# Patient Record
Sex: FEMALE | Race: WHITE | Employment: UNEMPLOYED | URBAN - METROPOLITAN AREA
[De-identification: names, ages, dates, MRNs, and addresses within clinical notes are randomized per-mention and may not be internally consistent; named-entity substitution may affect disease eponyms.]

---

## 2018-06-01 ENCOUNTER — TRANSFERRED RECORDS (OUTPATIENT)
Dept: HEALTH INFORMATION MANAGEMENT | Facility: CLINIC | Age: 33
End: 2018-06-01

## 2018-06-01 ENCOUNTER — HOSPITAL ENCOUNTER (INPATIENT)
Facility: CLINIC | Age: 33
LOS: 4 days | Discharge: HOME | End: 2018-06-05
Attending: OBSTETRICS & GYNECOLOGY | Admitting: OBSTETRICS & GYNECOLOGY
Payer: MEDICAID

## 2018-06-01 DIAGNOSIS — Z90.710 S/P EMERGENCY CESAREAN HYSTERECTOMY: Primary | ICD-10-CM

## 2018-06-01 PROBLEM — Z36.89 ENCOUNTER FOR TRIAGE IN PREGNANT PATIENT: Status: ACTIVE | Noted: 2018-06-01

## 2018-06-01 PROBLEM — O44.20 PARTIAL PREVIA: Status: ACTIVE | Noted: 2018-06-01

## 2018-06-01 LAB
AMPHETAMINES UR QL SCN: NEGATIVE
CANNABINOIDS UR QL: ABNORMAL
COCAINE UR QL: NEGATIVE
OPIATES UR QL SCN: NEGATIVE
PCP UR QL SCN: NEGATIVE

## 2018-06-01 PROCEDURE — 80307 DRUG TEST PRSMV CHEM ANLYZR: CPT | Performed by: OBSTETRICS & GYNECOLOGY

## 2018-06-01 PROCEDURE — 80349 CANNABINOIDS NATURAL: CPT | Performed by: OBSTETRICS & GYNECOLOGY

## 2018-06-01 PROCEDURE — 12000030 ZZH R&B OB INTERMEDIATE UMMC

## 2018-06-01 RX ORDER — ONDANSETRON 2 MG/ML
4 INJECTION INTRAMUSCULAR; INTRAVENOUS EVERY 6 HOURS PRN
Status: DISCONTINUED | OUTPATIENT
Start: 2018-06-01 | End: 2018-06-05 | Stop reason: HOSPADM

## 2018-06-01 RX ORDER — ACETAMINOPHEN 325 MG/1
975 TABLET ORAL EVERY 4 HOURS PRN
Status: DISCONTINUED | OUTPATIENT
Start: 2018-06-01 | End: 2018-06-02

## 2018-06-01 RX ORDER — LIDOCAINE 40 MG/G
CREAM TOPICAL
Status: DISCONTINUED | OUTPATIENT
Start: 2018-06-01 | End: 2018-06-02

## 2018-06-01 RX ORDER — PRENATAL VIT/IRON FUM/FOLIC AC 27MG-0.8MG
1 TABLET ORAL DAILY
COMMUNITY

## 2018-06-01 NOTE — IP AVS SNAPSHOT
MRN:[id number]                      After Visit Summary   2018    Emi Yun    MRN: [id number]           Thank you!     Thank you for choosing [facility] for your care. Our goal is always to provide you with excellent care. Hearing back from our patients is one way we can continue to improve our services. Please take a few minutes to complete the written survey that you may receive in the mail after you visit with us. Thank you!        Patient Information     Date Of Birth          1985        About your hospital stay     You were admitted on:  2018 You last received care in the:  [facility]    You were discharged on:  2018        Reason for your hospital stay       Maternity care, delivery by C section with uterus removed                  Who to Call     For medical emergencies, please call 911.  For non-urgent questions about your medical care, please call your primary care provider or clinic, [phone]  For questions related to your surgery, please call your surgery clinic        Attending Provider     Provider Specialty    Darrell Orozco MD OB/Gyn    Vernell Rain MD OB/Gyn       Primary Care Provider Office Phone # Fax #    [facility] [phone]      After Care Instructions     Activity       Review discharge instructions            Diet       Resume previous diet            Discharge Instructions - Postpartum visit       Schedule postpartum visit with your provider and return to clinic in 2 weeks for a surgical check in and in 6 weeks for a routine postpartum visit.                  Follow-up Appointments     Follow Up ([facility])       Please see your home OB provider or one of the Women's Health OB providers at 1 week for a well-being and mood check.            Follow Up and recommended labs and tests       Activity Instructions:   -  hysterectomy delivery: No lifting more than 15 pounds for 6 weeks.  Nothing  in the vagina for 6 weeks, no intercourse for 6 weeks. No strenuous exercise for 6 weeks. No driving until you have stopped taking your pain medications.    Call your health care provider if you have any of the following: Fever above 100.4 F; opening or drainage from your incision; soaking a sanitary pad with blood within 1 hour, or you see blood clots larger than a golf ball; malodorous vaginal discharge, severe or worsening pain uncontrolled by your pain medications, nausea and vomiting, severe headaches, changes in vision, calf swelling or pain, shortness of breath, problems coping with sadness, anxiety, or depression.  If you have any concerns about hurting yourself or the baby, call your provider immediately. You are encouraged to call with questions or concerns after you return home.                  Further instructions from your care team       Postop  Birth Instructions    Activity       Do not lift more than 10 pounds for 6 weeks after surgery.  Ask family and friends for help when you need it.    No driving until you have stopped taking your pain medications (usually two weeks after surgery).    No heavy exercise or activity for 6 weeks.  Don't do anything that will put a strain on your surgery site.    Don't strain when using the toilet.  Your care team may prescribe a stool softener if you have problems with your bowel movements.     To care for your incision:       Keep the incision clean and dry.    Do not soak your incision in water. No swimming or hot tubs until it has fully healed. You may soak in the bathtub if the water level is below your incision.    Do not use peroxide, gel, cream, lotion, or ointment on your incision.    Adjust your clothes to avoid pressure on your surgery site (check the elastic in your underwear for example).     You may see a small amount of clear or pink drainage and this is normal.  Check with your health care provider:       If the drainage increases or has an  odor.    If the incision reddens, you have swelling, or develop a rash.    If you have increased pain and the medicine we prescribed doesn't help.    If you have a fever above 100.4 F (38 C) with or without chills when placing thermometer under your tongue.   The area around your incision (surgery wound), will feel numb.  This is normal. The numbness should go away in less than a year.     Keep your hands clean:  Always wash your hands before touching your incision (surgery wound). This helps reduce your risk of infection. If your hands aren't dirty, you may use an alcohol hand-rub to clean your hands. Keep your nails clean and short.    Call your healthcare provider if you have any of these symptoms:       You soak a sanitary pad with blood within 1 hour, or you see blood clots larger than a golf ball.    Bleeding that lasts more than 6 weeks.    Vaginal discharge that smells bad.    Severe pain, cramping or tenderness in your lower belly area.    A need to urinate more frequently (use the toilet more often), more urgently (use the toilet very quickly), or it burns when you urinate.    Nausea and vomiting.    Redness, swelling or pain around a vein in your leg.    Problems breastfeeding or a red or painful area on your breast.    Chest pain and cough or are gasping for air.    Problems with coping with sadness, anxiety or depression. If you have concerns about hurting yourself or the baby, call your provider immediately.      You have questions or concerns after you return home.                  Pending Results     Date and Time Order Name Status Description    6/2/2018 1341 Surgical pathology exam In process     6/2/2018 0946 Plasma prepare order unit In process     6/1/2018 2202 Cannabinoids qualitative confirm urine In process             Statement of Approval     Ordered          06/05/18 4123  I have reviewed and agree with all the recommendations and orders detailed in this document.  EFFECTIVE NOW    "  Approved and electronically signed by:  Penelope Donald MD             Admission Information     Date & Time Provider Department Dept. Phone    2018 Vernell Rain MD [facility] [phone]      Your Vitals Were     Blood Pressure Pulse Temperature Respirations Height Weight    139/70 54 97.9  F (36.6  C) (Oral) 16 1.727 m (5' 8\") 95.3 kg (210 lb)    Last Period Pulse Oximetry BMI (Body Mass Index)             2017 98% 31.93 kg/m2         [facility] Information     [facility] lets you send messages to your doctor, view your test results, renew your prescriptions, schedule appointments and more. To sign up, go to [email or web address] . Click on \"Log in\" on the left side of the screen, which will take you to the Welcome page. Then click on \"Sign up Now\" on the right side of the page.     You will be asked to enter the access code listed below, as well as some personal information. Please follow the directions to create your username and password.     Your access code is: [email or web address]  Expires: 9/3/2018 11:43 AM     Your access code will  in 90 days. If you need help or a new code, please call your [facility] clinic or [phone].        Care EveryWhere ID     This is your Care EveryWhere ID. This could be used by other organizations to access your [facility] medical records  [id number]        Equal Access to Services     [facility] Hospital[facility] : Hadii [place] warreno Socarly, waaxda luqadaha, qaybta kaalmada satish, albert souza . So [facility] [phone].    ATENCIÓN: Si habla español, tiene a jeffers disposición servicios gratuitos de asistencia lingüística. Llame al [phone].    We comply with applicable federal civil rights laws and [place] laws. We do not discriminate on the basis of race, color, national origin, age, disability, sex, sexual orientation, or gender identity.               Review of your medicines      START taking        Dose / Directions    ferrous sulfate " "325 (65 Fe) MG tablet   Commonly known as:  IRON        Dose:  325 mg   Take 1 tablet (325 mg) by mouth daily (with breakfast)   Quantity:  90 tablet   Refills:  1       ibuprofen 600 MG tablet   Commonly known as:  ADVIL/MOTRIN        Dose:  600 mg   Take 1 tablet (600 mg) by mouth every 6 hours as needed for moderate pain   Quantity:  40 tablet   Refills:  0       oxyCODONE-acetaminophen 5-325 MG per tablet   Commonly known as:  PERCOCET        Dose:  1 tablet   Take 1 tablet by mouth every 6 hours as needed for pain   Quantity:  12 tablet   Refills:  0       senna-docusate 8.6-50 MG per tablet   Commonly known as:  SENOKOT-S;PERICOLACE        Dose:  1-2 tablet   Take 1-2 tablets by mouth 2 times daily as needed for constipation   Quantity:  60 tablet   Refills:  0         CONTINUE these medicines which have NOT CHANGED        Dose / Directions    prenatal multivitamin plus iron 27-0.8 MG Tabs per tablet        Dose:  1 tablet   Take 1 tablet by mouth daily   Refills:  0            Where to get your medicines      These medications were sent to [facility] [address]  [address] [address]     Phone:  [phone]     ferrous sulfate 325 (65 Fe) MG tablet    ibuprofen 600 MG tablet    senna-docusate 8.6-50 MG per tablet         Some of these will need a paper prescription and others can be bought over the counter. Ask your nurse if you have questions.     Bring a paper prescription for each of these medications     oxyCODONE-acetaminophen 5-325 MG per tablet                Protect others around you: Learn how to safely use, store and throw away your medicines at www.disposemymeds.org.        Information about your nerve block     Today you received a block to numb the nerves near your surgery site.    This is a block using local anesthetic or \"numbing\" medication injected around the nerves to anesthetize or \"numb\" the area supplied by those nerves. " This block is injected into the muscle layer near your surgical site. The type of anesthesia (Exparel) your anesthesia team used to numb your abdomen may give you relief for up to 72 hours.     Diet: There are no diet restrictions, but you should drink plenty of fluids, unless you are on a fluid-restricted diet.     Activity: If your surgical site is an arm or leg you should be careful with your affected limb, since it is possible to injure your limb without being aware of it due to the numbing. Until full feeling returns, you should guard against bumping or hitting your limb, and avoid extreme hot or cold temperatures on the skin.    Pain Medication: As the block wears off, the feeling will return as a tingling or prickly sensation near your surgical site. You will experience more discomfort from your incisions as the feeling returns. You may want to take a pain pill (a narcotic or Tylenol if this was prescribed by your surgeon) when you start to experience mild pain, before the pain becomes more severe. If your pain medications do not control your pain, you should notify your surgeon. If you are taking narcotics for pain management, do not drink alcohol, drive a car, or perform hazardous activities.  If you have questions or concerns you may call your surgeon at the number provided with your discharge instructions.     Call your surgeon if you experience blurry vision, ringing in the ears or metallic taste in your mouth.         Information about OPIOIDS     PRESCRIPTION OPIOIDS: WHAT YOU NEED TO KNOW   You have a prescription for an opioid (narcotic) pain medicine. Opioids can cause addiction. If you have a history of chemical dependency of any type, you are at a higher risk of becoming addicted to opioids. Only take this medicine after all other options have been tried. Take it for as short a time and as few doses as possible.     Do not:    Drive. If you drive while taking these medicines, you could be arrested  for driving under the influence (DUI).    Operate heavy machinery    Do any other dangerous activities while taking these medicines.     Drink any alcohol while taking these medicines.      Take with any other medicines that contain acetaminophen. Read all labels carefully. Look for the word  acetaminophen  or  Tylenol.  Ask your pharmacist if you have questions or are unsure.    Store your pills in a secure place, locked if possible. We will not replace any lost or stolen medicine. If you don t finish your medicine, please throw away (dispose) as directed by your pharmacist. The [place] Pollution Control Agency has more information about safe disposal: https://www.pca.[facility].mn.us/living-green/managing-unwanted-medications    All opioids tend to cause constipation. Drink plenty of water and eat foods that have a lot of fiber, such as fruits, vegetables, prune juice, apple juice and high-fiber cereal. Take a laxative (Miralax, milk of magnesia, Colace, Senna) if you don t move your bowels at least every other day.              Medication List: This is a list of all your medications and when to take them. Check marks below indicate your daily home schedule. Keep this list as a reference.      Medications           Morning Afternoon Evening Bedtime As Needed    ferrous sulfate 325 (65 Fe) MG tablet   Commonly known as:  IRON   Take 1 tablet (325 mg) by mouth daily (with breakfast)                                ibuprofen 600 MG tablet   Commonly known as:  ADVIL/MOTRIN   Take 1 tablet (600 mg) by mouth every 6 hours as needed for moderate pain   Last time this was given:  600 mg on 6/5/2018  7:06 AM                                oxyCODONE-acetaminophen 5-325 MG per tablet   Commonly known as:  PERCOCET   Take 1 tablet by mouth every 6 hours as needed for pain                                prenatal multivitamin plus iron 27-0.8 MG Tabs per tablet   Take 1 tablet by mouth daily                                 senna-docusate 8.6-50 MG per tablet   Commonly known as:  SENOKOT-S;PERICOLACE   Take 1-2 tablets by mouth 2 times daily as needed for constipation   Last time this was given:  2 tablets on 6/5/2018  7:39 AM

## 2018-06-01 NOTE — IP AVS SNAPSHOT
[facility]    [address] [address]    Phone:  [phone]                                       After Visit Summary   6/1/2018    Emi Yun    MRN: [id number]           After Visit Summary Signature Page     I have received my discharge instructions, and my questions have been answered. I have discussed any challenges I see with this plan with the nurse or doctor.    ..........................................................................................................................................  Patient/Patient Representative Signature      ..........................................................................................................................................  Patient Representative Print Name and Relationship to Patient    ..................................................               ................................................  Date                                            Time    ..........................................................................................................................................  Reviewed by Signature/Title    ...................................................              ..............................................  Date                                                            Time

## 2018-06-02 ENCOUNTER — APPOINTMENT (OUTPATIENT)
Dept: GENERAL RADIOLOGY | Facility: CLINIC | Age: 33
End: 2018-06-02
Attending: OBSTETRICS & GYNECOLOGY
Payer: MEDICAID

## 2018-06-02 ENCOUNTER — ANESTHESIA EVENT (OUTPATIENT)
Dept: SURGERY | Facility: CLINIC | Age: 33
End: 2018-06-02
Payer: MEDICAID

## 2018-06-02 ENCOUNTER — APPOINTMENT (OUTPATIENT)
Dept: LAB | Facility: CLINIC | Age: 33
End: 2018-06-02
Attending: OBSTETRICS & GYNECOLOGY
Payer: MEDICAID

## 2018-06-02 ENCOUNTER — HOSPITAL ENCOUNTER (INPATIENT)
Dept: ULTRASOUND IMAGING | Facility: CLINIC | Age: 33
End: 2018-06-02
Attending: OBSTETRICS & GYNECOLOGY
Payer: MEDICAID

## 2018-06-02 ENCOUNTER — ANESTHESIA (OUTPATIENT)
Dept: SURGERY | Facility: CLINIC | Age: 33
End: 2018-06-02
Payer: MEDICAID

## 2018-06-02 PROBLEM — Z90.710 S/P EMERGENCY CESAREAN HYSTERECTOMY: Status: ACTIVE | Noted: 2018-06-02

## 2018-06-02 LAB
ALBUMIN SERPL-MCNC: 2.2 G/DL (ref 3.4–5)
ALP SERPL-CCNC: 178 U/L (ref 40–150)
ALT SERPL W P-5'-P-CCNC: <6 U/L (ref 0–50)
ANION GAP SERPL CALCULATED.3IONS-SCNC: 9 MMOL/L (ref 3–14)
APTT PPP: 28 SEC (ref 22–37)
APTT PPP: 28 SEC (ref 22–37)
APTT PPP: 30 SEC (ref 22–37)
AST SERPL W P-5'-P-CCNC: 10 U/L (ref 0–45)
BASOPHILS # BLD AUTO: 0 10E9/L (ref 0–0.2)
BASOPHILS NFR BLD AUTO: 0.4 %
BILIRUB SERPL-MCNC: 0.3 MG/DL (ref 0.2–1.3)
BLD PROD TYP BPU: NORMAL
BLD UNIT ID BPU: 0
BLOOD PRODUCT CODE: NORMAL
BPU ID: NORMAL
BUN SERPL-MCNC: 9 MG/DL (ref 7–30)
CALCIUM SERPL-MCNC: 8.5 MG/DL (ref 8.5–10.1)
CHLORIDE SERPL-SCNC: 110 MMOL/L (ref 94–109)
CO2 SERPL-SCNC: 22 MMOL/L (ref 20–32)
CREAT SERPL-MCNC: 0.69 MG/DL (ref 0.52–1.04)
DIFFERENTIAL METHOD BLD: ABNORMAL
EOSINOPHIL # BLD AUTO: 0 10E9/L (ref 0–0.7)
EOSINOPHIL NFR BLD AUTO: 0.2 %
ERYTHROCYTE [DISTWIDTH] IN BLOOD BY AUTOMATED COUNT: 16.8 % (ref 10–15)
ERYTHROCYTE [DISTWIDTH] IN BLOOD BY AUTOMATED COUNT: 16.8 % (ref 10–15)
ERYTHROCYTE [DISTWIDTH] IN BLOOD BY AUTOMATED COUNT: 16.9 % (ref 10–15)
FIBRINOGEN PPP-MCNC: 399 MG/DL (ref 200–420)
FIBRINOGEN PPP-MCNC: 521 MG/DL (ref 200–420)
GFR SERPL CREATININE-BSD FRML MDRD: >90 ML/MIN/1.7M2
GLUCOSE SERPL-MCNC: 67 MG/DL (ref 70–99)
HCT VFR BLD AUTO: 29.8 % (ref 35–47)
HCT VFR BLD AUTO: 34.4 % (ref 35–47)
HCT VFR BLD AUTO: 35 % (ref 35–47)
HGB BLD-MCNC: 10.6 G/DL (ref 11.7–15.7)
HGB BLD-MCNC: 10.6 G/DL (ref 11.7–15.7)
HGB BLD-MCNC: 9.4 G/DL (ref 11.7–15.7)
HGB BLD-MCNC: 9.8 G/DL (ref 11.7–15.7)
IMM GRANULOCYTES # BLD: 0 10E9/L (ref 0–0.4)
IMM GRANULOCYTES NFR BLD: 0.3 %
INR PPP: 0.98 (ref 0.86–1.14)
INR PPP: 1.02 (ref 0.86–1.14)
INR PPP: 1.07 (ref 0.86–1.14)
LYMPHOCYTES # BLD AUTO: 2.1 10E9/L (ref 0.8–5.3)
LYMPHOCYTES NFR BLD AUTO: 21.8 %
MCH RBC QN AUTO: 24.3 PG (ref 26.5–33)
MCH RBC QN AUTO: 25.8 PG (ref 26.5–33)
MCH RBC QN AUTO: 26.2 PG (ref 26.5–33)
MCHC RBC AUTO-ENTMCNC: 30.3 G/DL (ref 31.5–36.5)
MCHC RBC AUTO-ENTMCNC: 30.8 G/DL (ref 31.5–36.5)
MCHC RBC AUTO-ENTMCNC: 31.5 G/DL (ref 31.5–36.5)
MCV RBC AUTO: 80 FL (ref 78–100)
MCV RBC AUTO: 83 FL (ref 78–100)
MCV RBC AUTO: 84 FL (ref 78–100)
MONOCYTES # BLD AUTO: 0.4 10E9/L (ref 0–1.3)
MONOCYTES NFR BLD AUTO: 4.5 %
NEUTROPHILS # BLD AUTO: 7.1 10E9/L (ref 1.6–8.3)
NEUTROPHILS NFR BLD AUTO: 72.8 %
NRBC # BLD AUTO: 0 10*3/UL
NRBC BLD AUTO-RTO: 0 /100
PLATELET # BLD AUTO: 241 10E9/L (ref 150–450)
PLATELET # BLD AUTO: 266 10E9/L (ref 150–450)
PLATELET # BLD AUTO: 281 10E9/L (ref 150–450)
PLATELET # BLD AUTO: 302 10E9/L (ref 150–450)
POTASSIUM SERPL-SCNC: 4.2 MMOL/L (ref 3.4–5.3)
PROT SERPL-MCNC: 6.9 G/DL (ref 6.8–8.8)
RBC # BLD AUTO: 3.59 10E12/L (ref 3.8–5.2)
RBC # BLD AUTO: 4.11 10E12/L (ref 3.8–5.2)
RBC # BLD AUTO: 4.37 10E12/L (ref 3.8–5.2)
SODIUM SERPL-SCNC: 141 MMOL/L (ref 133–144)
T PALLIDUM AB SER QL: NONREACTIVE
TRANSFUSION STATUS PATIENT QL: NORMAL
WBC # BLD AUTO: 17.6 10E9/L (ref 4–11)
WBC # BLD AUTO: 17.7 10E9/L (ref 4–11)
WBC # BLD AUTO: 9.8 10E9/L (ref 4–11)

## 2018-06-02 PROCEDURE — 0TJB8ZZ INSPECTION OF BLADDER, VIA NATURAL OR ARTIFICIAL OPENING ENDOSCOPIC: ICD-10-PCS | Performed by: OBSTETRICS & GYNECOLOGY

## 2018-06-02 PROCEDURE — 25000128 H RX IP 250 OP 636

## 2018-06-02 PROCEDURE — 85610 PROTHROMBIN TIME: CPT | Performed by: ANESTHESIOLOGY

## 2018-06-02 PROCEDURE — 40000170 ZZH STATISTIC PRE-PROCEDURE ASSESSMENT II: Performed by: OBSTETRICS & GYNECOLOGY

## 2018-06-02 PROCEDURE — 27210995 ZZH RX 272: Performed by: OBSTETRICS & GYNECOLOGY

## 2018-06-02 PROCEDURE — 86706 HEP B SURFACE ANTIBODY: CPT | Performed by: OBSTETRICS & GYNECOLOGY

## 2018-06-02 PROCEDURE — 25000128 H RX IP 250 OP 636: Performed by: OBSTETRICS & GYNECOLOGY

## 2018-06-02 PROCEDURE — 25000132 ZZH RX MED GY IP 250 OP 250 PS 637: Performed by: OBSTETRICS & GYNECOLOGY

## 2018-06-02 PROCEDURE — 25000565 ZZH ISOFLURANE, EA 15 MIN: Performed by: OBSTETRICS & GYNECOLOGY

## 2018-06-02 PROCEDURE — P9059 PLASMA, FRZ BETWEEN 8-24HOUR: HCPCS | Performed by: OBSTETRICS & GYNECOLOGY

## 2018-06-02 PROCEDURE — 36000068 ZZH SURGERY LEVEL 5 1ST 30 MIN - UMMC: Performed by: OBSTETRICS & GYNECOLOGY

## 2018-06-02 PROCEDURE — 88307 TISSUE EXAM BY PATHOLOGIST: CPT | Performed by: OBSTETRICS & GYNECOLOGY

## 2018-06-02 PROCEDURE — 37000008 ZZH ANESTHESIA TECHNICAL FEE, 1ST 30 MIN: Performed by: OBSTETRICS & GYNECOLOGY

## 2018-06-02 PROCEDURE — 25000128 H RX IP 250 OP 636: Performed by: NURSE ANESTHETIST, CERTIFIED REGISTERED

## 2018-06-02 PROCEDURE — P9016 RBC LEUKOCYTES REDUCED: HCPCS | Performed by: OBSTETRICS & GYNECOLOGY

## 2018-06-02 PROCEDURE — 86900 BLOOD TYPING SEROLOGIC ABO: CPT | Performed by: OBSTETRICS & GYNECOLOGY

## 2018-06-02 PROCEDURE — 25000128 H RX IP 250 OP 636: Performed by: ANESTHESIOLOGY

## 2018-06-02 PROCEDURE — 85049 AUTOMATED PLATELET COUNT: CPT | Performed by: OBSTETRICS & GYNECOLOGY

## 2018-06-02 PROCEDURE — 80053 COMPREHEN METABOLIC PANEL: CPT | Performed by: OBSTETRICS & GYNECOLOGY

## 2018-06-02 PROCEDURE — 40000977 ZZH STATISTIC ATTENDANCE AT DELIVERY

## 2018-06-02 PROCEDURE — 36415 COLL VENOUS BLD VENIPUNCTURE: CPT | Performed by: OBSTETRICS & GYNECOLOGY

## 2018-06-02 PROCEDURE — 0UT70ZZ RESECTION OF BILATERAL FALLOPIAN TUBES, OPEN APPROACH: ICD-10-PCS | Performed by: OBSTETRICS & GYNECOLOGY

## 2018-06-02 PROCEDURE — 86923 COMPATIBILITY TEST ELECTRIC: CPT | Performed by: OBSTETRICS & GYNECOLOGY

## 2018-06-02 PROCEDURE — 12000030 ZZH R&B OB INTERMEDIATE UMMC

## 2018-06-02 PROCEDURE — 40000278 XR SURGERY CARM FLUORO LESS THAN 5 MIN: Mod: TC

## 2018-06-02 PROCEDURE — 71000015 ZZH RECOVERY PHASE 1 LEVEL 2 EA ADDTL HR: Performed by: OBSTETRICS & GYNECOLOGY

## 2018-06-02 PROCEDURE — 86780 TREPONEMA PALLIDUM: CPT | Performed by: OBSTETRICS & GYNECOLOGY

## 2018-06-02 PROCEDURE — 85730 THROMBOPLASTIN TIME PARTIAL: CPT | Performed by: ANESTHESIOLOGY

## 2018-06-02 PROCEDURE — 85730 THROMBOPLASTIN TIME PARTIAL: CPT | Performed by: OBSTETRICS & GYNECOLOGY

## 2018-06-02 PROCEDURE — 76811 OB US DETAILED SNGL FETUS: CPT

## 2018-06-02 PROCEDURE — G0463 HOSPITAL OUTPT CLINIC VISIT: HCPCS

## 2018-06-02 PROCEDURE — 86850 RBC ANTIBODY SCREEN: CPT | Performed by: OBSTETRICS & GYNECOLOGY

## 2018-06-02 PROCEDURE — 36415 COLL VENOUS BLD VENIPUNCTURE: CPT | Performed by: ANESTHESIOLOGY

## 2018-06-02 PROCEDURE — C9290 INJ, BUPIVACAINE LIPOSOME: HCPCS | Performed by: ANESTHESIOLOGY

## 2018-06-02 PROCEDURE — 88307 TISSUE EXAM BY PATHOLOGIST: CPT | Mod: 26 | Performed by: OBSTETRICS & GYNECOLOGY

## 2018-06-02 PROCEDURE — 36000070 ZZH SURGERY LEVEL 5 EA 15 ADDTL MIN - UMMC: Performed by: OBSTETRICS & GYNECOLOGY

## 2018-06-02 PROCEDURE — 87389 HIV-1 AG W/HIV-1&-2 AB AG IA: CPT | Performed by: OBSTETRICS & GYNECOLOGY

## 2018-06-02 PROCEDURE — 27210794 ZZH OR GENERAL SUPPLY STERILE: Performed by: OBSTETRICS & GYNECOLOGY

## 2018-06-02 PROCEDURE — 85025 COMPLETE CBC W/AUTO DIFF WBC: CPT | Performed by: OBSTETRICS & GYNECOLOGY

## 2018-06-02 PROCEDURE — 85610 PROTHROMBIN TIME: CPT | Performed by: OBSTETRICS & GYNECOLOGY

## 2018-06-02 PROCEDURE — 40000344 ZZHCL STATISTIC THAWING COMPONENT: Performed by: OBSTETRICS & GYNECOLOGY

## 2018-06-02 PROCEDURE — 25000125 ZZHC RX 250: Performed by: NURSE ANESTHETIST, CERTIFIED REGISTERED

## 2018-06-02 PROCEDURE — 0UT90ZZ RESECTION OF UTERUS, OPEN APPROACH: ICD-10-PCS | Performed by: OBSTETRICS & GYNECOLOGY

## 2018-06-02 PROCEDURE — 85384 FIBRINOGEN ACTIVITY: CPT | Performed by: ANESTHESIOLOGY

## 2018-06-02 PROCEDURE — 85018 HEMOGLOBIN: CPT | Performed by: OBSTETRICS & GYNECOLOGY

## 2018-06-02 PROCEDURE — 86762 RUBELLA ANTIBODY: CPT | Performed by: OBSTETRICS & GYNECOLOGY

## 2018-06-02 PROCEDURE — 85384 FIBRINOGEN ACTIVITY: CPT | Performed by: OBSTETRICS & GYNECOLOGY

## 2018-06-02 PROCEDURE — 37000009 ZZH ANESTHESIA TECHNICAL FEE, EACH ADDTL 15 MIN: Performed by: OBSTETRICS & GYNECOLOGY

## 2018-06-02 PROCEDURE — 25000125 ZZHC RX 250: Performed by: OBSTETRICS & GYNECOLOGY

## 2018-06-02 PROCEDURE — 86803 HEPATITIS C AB TEST: CPT | Performed by: OBSTETRICS & GYNECOLOGY

## 2018-06-02 PROCEDURE — 71000014 ZZH RECOVERY PHASE 1 LEVEL 2 FIRST HR: Performed by: OBSTETRICS & GYNECOLOGY

## 2018-06-02 PROCEDURE — 25000125 ZZHC RX 250: Performed by: ANESTHESIOLOGY

## 2018-06-02 PROCEDURE — 86901 BLOOD TYPING SEROLOGIC RH(D): CPT | Performed by: OBSTETRICS & GYNECOLOGY

## 2018-06-02 PROCEDURE — 85027 COMPLETE CBC AUTOMATED: CPT | Performed by: OBSTETRICS & GYNECOLOGY

## 2018-06-02 PROCEDURE — 74018 RADEX ABDOMEN 1 VIEW: CPT

## 2018-06-02 PROCEDURE — P9041 ALBUMIN (HUMAN),5%, 50ML: HCPCS | Performed by: NURSE ANESTHETIST, CERTIFIED REGISTERED

## 2018-06-02 RX ORDER — SODIUM CHLORIDE, SODIUM LACTATE, POTASSIUM CHLORIDE, CALCIUM CHLORIDE 600; 310; 30; 20 MG/100ML; MG/100ML; MG/100ML; MG/100ML
INJECTION, SOLUTION INTRAVENOUS CONTINUOUS
Status: DISCONTINUED | OUTPATIENT
Start: 2018-06-02 | End: 2018-06-02 | Stop reason: HOSPADM

## 2018-06-02 RX ORDER — HYDROCORTISONE 2.5 %
CREAM (GRAM) TOPICAL 3 TIMES DAILY PRN
Status: DISCONTINUED | OUTPATIENT
Start: 2018-06-02 | End: 2018-06-05 | Stop reason: HOSPADM

## 2018-06-02 RX ORDER — DIPHENHYDRAMINE HCL 25 MG
25 CAPSULE ORAL EVERY 6 HOURS PRN
Status: DISCONTINUED | OUTPATIENT
Start: 2018-06-02 | End: 2018-06-05 | Stop reason: HOSPADM

## 2018-06-02 RX ORDER — MORPHINE SULFATE 1 MG/ML
INJECTION, SOLUTION EPIDURAL; INTRATHECAL; INTRAVENOUS PRN
Status: DISCONTINUED | OUTPATIENT
Start: 2018-06-02 | End: 2018-06-02

## 2018-06-02 RX ORDER — FENTANYL CITRATE 50 UG/ML
INJECTION, SOLUTION INTRAMUSCULAR; INTRAVENOUS PRN
Status: DISCONTINUED | OUTPATIENT
Start: 2018-06-02 | End: 2018-06-02

## 2018-06-02 RX ORDER — KETOROLAC TROMETHAMINE 30 MG/ML
30 INJECTION, SOLUTION INTRAMUSCULAR; INTRAVENOUS EVERY 6 HOURS
Status: COMPLETED | OUTPATIENT
Start: 2018-06-02 | End: 2018-06-03

## 2018-06-02 RX ORDER — GLYCOPYRROLATE 0.2 MG/ML
INJECTION, SOLUTION INTRAMUSCULAR; INTRAVENOUS PRN
Status: DISCONTINUED | OUTPATIENT
Start: 2018-06-02 | End: 2018-06-02

## 2018-06-02 RX ORDER — HYDROMORPHONE HYDROCHLORIDE 1 MG/ML
.3-.5 INJECTION, SOLUTION INTRAMUSCULAR; INTRAVENOUS; SUBCUTANEOUS EVERY 5 MIN PRN
Status: DISCONTINUED | OUTPATIENT
Start: 2018-06-02 | End: 2018-06-02

## 2018-06-02 RX ORDER — SODIUM CHLORIDE 9 MG/ML
INJECTION, SOLUTION INTRAVENOUS CONTINUOUS PRN
Status: DISCONTINUED | OUTPATIENT
Start: 2018-06-02 | End: 2018-06-02

## 2018-06-02 RX ORDER — CLINDAMYCIN PHOSPHATE 900 MG/50ML
900 INJECTION, SOLUTION INTRAVENOUS
Status: COMPLETED | OUTPATIENT
Start: 2018-06-02 | End: 2018-06-02

## 2018-06-02 RX ORDER — OXYCODONE HYDROCHLORIDE 5 MG/1
5-10 TABLET ORAL
Status: DISCONTINUED | OUTPATIENT
Start: 2018-06-02 | End: 2018-06-05 | Stop reason: HOSPADM

## 2018-06-02 RX ORDER — NALOXONE HYDROCHLORIDE 0.4 MG/ML
.1-.4 INJECTION, SOLUTION INTRAMUSCULAR; INTRAVENOUS; SUBCUTANEOUS
Status: ACTIVE | OUTPATIENT
Start: 2018-06-02 | End: 2018-06-03

## 2018-06-02 RX ORDER — CLINDAMYCIN PHOSPHATE 900 MG/50ML
900 INJECTION, SOLUTION INTRAVENOUS EVERY 8 HOURS
Status: COMPLETED | OUTPATIENT
Start: 2018-06-02 | End: 2018-06-03

## 2018-06-02 RX ORDER — CITRIC ACID/SODIUM CITRATE 334-500MG
30 SOLUTION, ORAL ORAL
Status: DISCONTINUED | OUTPATIENT
Start: 2018-06-02 | End: 2018-06-02 | Stop reason: HOSPADM

## 2018-06-02 RX ORDER — ACETAMINOPHEN 325 MG/1
650 TABLET ORAL EVERY 4 HOURS PRN
Status: DISCONTINUED | OUTPATIENT
Start: 2018-06-05 | End: 2018-06-02

## 2018-06-02 RX ORDER — BISACODYL 10 MG
10 SUPPOSITORY, RECTAL RECTAL DAILY PRN
Status: DISCONTINUED | OUTPATIENT
Start: 2018-06-04 | End: 2018-06-05 | Stop reason: HOSPADM

## 2018-06-02 RX ORDER — OXYTOCIN/0.9 % SODIUM CHLORIDE 30/500 ML
100 PLASTIC BAG, INJECTION (ML) INTRAVENOUS CONTINUOUS
Status: DISCONTINUED | OUTPATIENT
Start: 2018-06-02 | End: 2018-06-02

## 2018-06-02 RX ORDER — SODIUM CHLORIDE, SODIUM LACTATE, POTASSIUM CHLORIDE, CALCIUM CHLORIDE 600; 310; 30; 20 MG/100ML; MG/100ML; MG/100ML; MG/100ML
INJECTION, SOLUTION INTRAVENOUS CONTINUOUS PRN
Status: DISCONTINUED | OUTPATIENT
Start: 2018-06-02 | End: 2018-06-02

## 2018-06-02 RX ORDER — ALBUMIN, HUMAN INJ 5% 5 %
SOLUTION INTRAVENOUS CONTINUOUS PRN
Status: DISCONTINUED | OUTPATIENT
Start: 2018-06-02 | End: 2018-06-02

## 2018-06-02 RX ORDER — AMOXICILLIN 250 MG
1 CAPSULE ORAL 2 TIMES DAILY PRN
Status: DISCONTINUED | OUTPATIENT
Start: 2018-06-02 | End: 2018-06-05 | Stop reason: HOSPADM

## 2018-06-02 RX ORDER — OXYTOCIN/0.9 % SODIUM CHLORIDE 30/500 ML
PLASTIC BAG, INJECTION (ML) INTRAVENOUS
Status: DISCONTINUED
Start: 2018-06-02 | End: 2018-06-02 | Stop reason: HOSPADM

## 2018-06-02 RX ORDER — ONDANSETRON 2 MG/ML
4 INJECTION INTRAMUSCULAR; INTRAVENOUS EVERY 30 MIN PRN
Status: DISCONTINUED | OUTPATIENT
Start: 2018-06-02 | End: 2018-06-02

## 2018-06-02 RX ORDER — SODIUM CHLORIDE, SODIUM LACTATE, POTASSIUM CHLORIDE, CALCIUM CHLORIDE 600; 310; 30; 20 MG/100ML; MG/100ML; MG/100ML; MG/100ML
INJECTION, SOLUTION INTRAVENOUS CONTINUOUS
Status: DISCONTINUED | OUTPATIENT
Start: 2018-06-02 | End: 2018-06-05 | Stop reason: HOSPADM

## 2018-06-02 RX ORDER — NEOSTIGMINE METHYLSULFATE 1 MG/ML
VIAL (ML) INJECTION PRN
Status: DISCONTINUED | OUTPATIENT
Start: 2018-06-02 | End: 2018-06-02

## 2018-06-02 RX ORDER — MAGNESIUM HYDROXIDE 1200 MG/15ML
LIQUID ORAL PRN
Status: DISCONTINUED | OUTPATIENT
Start: 2018-06-02 | End: 2018-06-02

## 2018-06-02 RX ORDER — BUPIVACAINE HYDROCHLORIDE 7.5 MG/ML
INJECTION, SOLUTION INTRASPINAL
Status: DISCONTINUED
Start: 2018-06-02 | End: 2018-06-02 | Stop reason: HOSPADM

## 2018-06-02 RX ORDER — NALOXONE HYDROCHLORIDE 0.4 MG/ML
.1-.4 INJECTION, SOLUTION INTRAMUSCULAR; INTRAVENOUS; SUBCUTANEOUS
Status: DISCONTINUED | OUTPATIENT
Start: 2018-06-02 | End: 2018-06-02

## 2018-06-02 RX ORDER — PROPOFOL 10 MG/ML
INJECTION, EMULSION INTRAVENOUS PRN
Status: DISCONTINUED | OUTPATIENT
Start: 2018-06-02 | End: 2018-06-02

## 2018-06-02 RX ORDER — SODIUM CHLORIDE, SODIUM LACTATE, POTASSIUM CHLORIDE, CALCIUM CHLORIDE 600; 310; 30; 20 MG/100ML; MG/100ML; MG/100ML; MG/100ML
INJECTION, SOLUTION INTRAVENOUS CONTINUOUS
Status: DISCONTINUED | OUTPATIENT
Start: 2018-06-02 | End: 2018-06-02

## 2018-06-02 RX ORDER — MORPHINE SULFATE 1 MG/ML
INJECTION, SOLUTION EPIDURAL; INTRATHECAL; INTRAVENOUS
Status: DISCONTINUED
Start: 2018-06-02 | End: 2018-06-02 | Stop reason: HOSPADM

## 2018-06-02 RX ORDER — ONDANSETRON 2 MG/ML
4 INJECTION INTRAMUSCULAR; INTRAVENOUS EVERY 6 HOURS PRN
Status: DISCONTINUED | OUTPATIENT
Start: 2018-06-02 | End: 2018-06-02

## 2018-06-02 RX ORDER — LIDOCAINE 40 MG/G
CREAM TOPICAL
Status: DISCONTINUED | OUTPATIENT
Start: 2018-06-02 | End: 2018-06-05 | Stop reason: HOSPADM

## 2018-06-02 RX ORDER — CITRIC ACID/SODIUM CITRATE 334-500MG
30 SOLUTION, ORAL ORAL
Status: COMPLETED | OUTPATIENT
Start: 2018-06-02 | End: 2018-06-02

## 2018-06-02 RX ORDER — SODIUM CHLORIDE, SODIUM LACTATE, POTASSIUM CHLORIDE, CALCIUM CHLORIDE 600; 310; 30; 20 MG/100ML; MG/100ML; MG/100ML; MG/100ML
INJECTION, SOLUTION INTRAVENOUS
Status: COMPLETED
Start: 2018-06-02 | End: 2018-06-02

## 2018-06-02 RX ORDER — CITRIC ACID/SODIUM CITRATE 334-500MG
SOLUTION, ORAL ORAL
Status: DISCONTINUED
Start: 2018-06-02 | End: 2018-06-02 | Stop reason: HOSPADM

## 2018-06-02 RX ORDER — DIPHENHYDRAMINE HYDROCHLORIDE 50 MG/ML
25 INJECTION INTRAMUSCULAR; INTRAVENOUS EVERY 6 HOURS PRN
Status: DISCONTINUED | OUTPATIENT
Start: 2018-06-02 | End: 2018-06-05 | Stop reason: HOSPADM

## 2018-06-02 RX ORDER — DEXTROSE, SODIUM CHLORIDE, SODIUM LACTATE, POTASSIUM CHLORIDE, AND CALCIUM CHLORIDE 5; .6; .31; .03; .02 G/100ML; G/100ML; G/100ML; G/100ML; G/100ML
INJECTION, SOLUTION INTRAVENOUS CONTINUOUS
Status: DISCONTINUED | OUTPATIENT
Start: 2018-06-02 | End: 2018-06-02

## 2018-06-02 RX ORDER — OXYTOCIN/0.9 % SODIUM CHLORIDE 30/500 ML
PLASTIC BAG, INJECTION (ML) INTRAVENOUS CONTINUOUS PRN
Status: DISCONTINUED | OUTPATIENT
Start: 2018-06-02 | End: 2018-06-02

## 2018-06-02 RX ORDER — CLINDAMYCIN PHOSPHATE 900 MG/50ML
900 INJECTION, SOLUTION INTRAVENOUS
Status: DISCONTINUED | OUTPATIENT
Start: 2018-06-02 | End: 2018-06-02 | Stop reason: HOSPADM

## 2018-06-02 RX ORDER — ONDANSETRON 2 MG/ML
INJECTION INTRAMUSCULAR; INTRAVENOUS PRN
Status: DISCONTINUED | OUTPATIENT
Start: 2018-06-02 | End: 2018-06-02

## 2018-06-02 RX ORDER — ACETAMINOPHEN 325 MG/1
975 TABLET ORAL EVERY 8 HOURS
Status: DISCONTINUED | OUTPATIENT
Start: 2018-06-02 | End: 2018-06-05 | Stop reason: HOSPADM

## 2018-06-02 RX ORDER — ONDANSETRON 4 MG/1
4 TABLET, ORALLY DISINTEGRATING ORAL EVERY 30 MIN PRN
Status: DISCONTINUED | OUTPATIENT
Start: 2018-06-02 | End: 2018-06-02

## 2018-06-02 RX ORDER — EPHEDRINE SULFATE 50 MG/ML
INJECTION, SOLUTION INTRAMUSCULAR; INTRAVENOUS; SUBCUTANEOUS PRN
Status: DISCONTINUED | OUTPATIENT
Start: 2018-06-02 | End: 2018-06-02

## 2018-06-02 RX ORDER — CALCIUM CHLORIDE 100 MG/ML
INJECTION INTRAVENOUS; INTRAVENTRICULAR PRN
Status: DISCONTINUED | OUTPATIENT
Start: 2018-06-02 | End: 2018-06-02

## 2018-06-02 RX ORDER — FENTANYL CITRATE 50 UG/ML
25-50 INJECTION, SOLUTION INTRAMUSCULAR; INTRAVENOUS
Status: DISCONTINUED | OUTPATIENT
Start: 2018-06-02 | End: 2018-06-02

## 2018-06-02 RX ORDER — LANOLIN 100 %
OINTMENT (GRAM) TOPICAL
Status: DISCONTINUED | OUTPATIENT
Start: 2018-06-02 | End: 2018-06-05 | Stop reason: HOSPADM

## 2018-06-02 RX ORDER — AMOXICILLIN 250 MG
2 CAPSULE ORAL 2 TIMES DAILY PRN
Status: DISCONTINUED | OUTPATIENT
Start: 2018-06-02 | End: 2018-06-05 | Stop reason: HOSPADM

## 2018-06-02 RX ORDER — SIMETHICONE 80 MG
80 TABLET,CHEWABLE ORAL 4 TIMES DAILY PRN
Status: DISCONTINUED | OUTPATIENT
Start: 2018-06-02 | End: 2018-06-05 | Stop reason: HOSPADM

## 2018-06-02 RX ORDER — BUPIVACAINE HYDROCHLORIDE 7.5 MG/ML
INJECTION, SOLUTION INTRASPINAL PRN
Status: DISCONTINUED | OUTPATIENT
Start: 2018-06-02 | End: 2018-06-02

## 2018-06-02 RX ADMIN — HYDROMORPHONE HYDROCHLORIDE 0.2 MG: 1 INJECTION, SOLUTION INTRAMUSCULAR; INTRAVENOUS; SUBCUTANEOUS at 14:28

## 2018-06-02 RX ADMIN — PROPOFOL 40 MG: 10 INJECTION, EMULSION INTRAVENOUS at 12:51

## 2018-06-02 RX ADMIN — SODIUM CHLORIDE, POTASSIUM CHLORIDE, SODIUM LACTATE AND CALCIUM CHLORIDE: 600; 310; 30; 20 INJECTION, SOLUTION INTRAVENOUS at 13:00

## 2018-06-02 RX ADMIN — Medication 0.15 MG: at 12:00

## 2018-06-02 RX ADMIN — PHENYLEPHRINE HYDROCHLORIDE 100 MCG: 10 INJECTION, SOLUTION INTRAMUSCULAR; INTRAVENOUS; SUBCUTANEOUS at 13:31

## 2018-06-02 RX ADMIN — FENTANYL CITRATE 25 MCG: 50 INJECTION INTRAMUSCULAR; INTRAVENOUS at 15:36

## 2018-06-02 RX ADMIN — GLYCOPYRROLATE 0.1 MG: 0.2 INJECTION, SOLUTION INTRAMUSCULAR; INTRAVENOUS at 12:24

## 2018-06-02 RX ADMIN — GENTAMICIN SULFATE 140 MG: 40 INJECTION, SOLUTION INTRAMUSCULAR; INTRAVENOUS at 10:49

## 2018-06-02 RX ADMIN — BENZOCAINE, MENTHOL 1 LOZENGE: 15; 3.6 LOZENGE ORAL at 22:06

## 2018-06-02 RX ADMIN — PHENYLEPHRINE HYDROCHLORIDE 200 MCG: 10 INJECTION, SOLUTION INTRAMUSCULAR; INTRAVENOUS; SUBCUTANEOUS at 13:30

## 2018-06-02 RX ADMIN — FENTANYL CITRATE 50 MCG: 50 INJECTION, SOLUTION INTRAMUSCULAR; INTRAVENOUS at 12:52

## 2018-06-02 RX ADMIN — ROCURONIUM BROMIDE 50 MG: 10 INJECTION INTRAVENOUS at 12:49

## 2018-06-02 RX ADMIN — PHENYLEPHRINE HYDROCHLORIDE 0.1 MCG: 10 INJECTION, SOLUTION INTRAMUSCULAR; INTRAVENOUS; SUBCUTANEOUS at 12:10

## 2018-06-02 RX ADMIN — FENTANYL CITRATE 25 MCG: 50 INJECTION, SOLUTION INTRAMUSCULAR; INTRAVENOUS at 14:21

## 2018-06-02 RX ADMIN — CLINDAMYCIN PHOSPHATE 900 MG: 18 INJECTION, SOLUTION INTRAVENOUS at 20:00

## 2018-06-02 RX ADMIN — ALBUMIN HUMAN: 0.05 INJECTION, SOLUTION INTRAVENOUS at 13:05

## 2018-06-02 RX ADMIN — Medication 2.5 MG: at 12:19

## 2018-06-02 RX ADMIN — SODIUM CHLORIDE: 9 INJECTION, SOLUTION INTRAVENOUS at 13:00

## 2018-06-02 RX ADMIN — CALCIUM CHLORIDE 500 MG: 100 INJECTION, SOLUTION INTRAVENOUS at 13:30

## 2018-06-02 RX ADMIN — PHENYLEPHRINE HYDROCHLORIDE 100 MCG: 10 INJECTION, SOLUTION INTRAMUSCULAR; INTRAVENOUS; SUBCUTANEOUS at 13:28

## 2018-06-02 RX ADMIN — OXYTOCIN-SODIUM CHLORIDE 0.9% IV SOLN 30 UNIT/500ML 300 ML/HR: 30-0.9/5 SOLUTION at 12:39

## 2018-06-02 RX ADMIN — SODIUM CHLORIDE, POTASSIUM CHLORIDE, SODIUM LACTATE AND CALCIUM CHLORIDE: 600; 310; 30; 20 INJECTION, SOLUTION INTRAVENOUS at 17:49

## 2018-06-02 RX ADMIN — BUPIVACAINE 20 ML: 13.3 INJECTION, SUSPENSION, LIPOSOMAL INFILTRATION at 15:07

## 2018-06-02 RX ADMIN — ROCURONIUM BROMIDE 0.3 MG: 10 INJECTION INTRAVENOUS at 15:06

## 2018-06-02 RX ADMIN — FENTANYL CITRATE 25 MCG: 50 INJECTION, SOLUTION INTRAMUSCULAR; INTRAVENOUS at 13:57

## 2018-06-02 RX ADMIN — BUPIVACAINE HYDROCHLORIDE IN DEXTROSE 1.8 ML: 7.5 INJECTION, SOLUTION SUBARACHNOID at 12:00

## 2018-06-02 RX ADMIN — PHENYLEPHRINE HYDROCHLORIDE 100 MCG: 10 INJECTION, SOLUTION INTRAMUSCULAR; INTRAVENOUS; SUBCUTANEOUS at 13:32

## 2018-06-02 RX ADMIN — KETOROLAC TROMETHAMINE 30 MG: 30 INJECTION, SOLUTION INTRAMUSCULAR at 17:49

## 2018-06-02 RX ADMIN — PHENYLEPHRINE HYDROCHLORIDE 200 MCG: 10 INJECTION, SOLUTION INTRAMUSCULAR; INTRAVENOUS; SUBCUTANEOUS at 13:06

## 2018-06-02 RX ADMIN — CALCIUM CHLORIDE 250 MG: 100 INJECTION, SOLUTION INTRAVENOUS at 13:18

## 2018-06-02 RX ADMIN — OXYCODONE HYDROCHLORIDE 10 MG: 5 TABLET ORAL at 23:23

## 2018-06-02 RX ADMIN — SODIUM CHLORIDE, POTASSIUM CHLORIDE, SODIUM LACTATE AND CALCIUM CHLORIDE 1000 ML: 600; 310; 30; 20 INJECTION, SOLUTION INTRAVENOUS at 09:46

## 2018-06-02 RX ADMIN — OXYCODONE HYDROCHLORIDE 10 MG: 5 TABLET ORAL at 20:00

## 2018-06-02 RX ADMIN — NEOSTIGMINE METHYLSULFATE 4 MG: 1 INJECTION, SOLUTION INTRAVENOUS at 15:10

## 2018-06-02 RX ADMIN — PHENYLEPHRINE HYDROCHLORIDE 100 MCG: 10 INJECTION, SOLUTION INTRAMUSCULAR; INTRAVENOUS; SUBCUTANEOUS at 12:54

## 2018-06-02 RX ADMIN — SODIUM CHLORIDE, POTASSIUM CHLORIDE, SODIUM LACTATE AND CALCIUM CHLORIDE: 600; 310; 30; 20 INJECTION, SOLUTION INTRAVENOUS at 11:51

## 2018-06-02 RX ADMIN — PHENYLEPHRINE HYDROCHLORIDE 100 MCG: 10 INJECTION, SOLUTION INTRAMUSCULAR; INTRAVENOUS; SUBCUTANEOUS at 13:02

## 2018-06-02 RX ADMIN — PROPOFOL 30 MG: 10 INJECTION, EMULSION INTRAVENOUS at 15:00

## 2018-06-02 RX ADMIN — PROPOFOL 130 MG: 10 INJECTION, EMULSION INTRAVENOUS at 12:49

## 2018-06-02 RX ADMIN — SENNOSIDES AND DOCUSATE SODIUM 1 TABLET: 8.6; 5 TABLET ORAL at 20:00

## 2018-06-02 RX ADMIN — FENTANYL CITRATE 25 MCG: 50 INJECTION INTRAMUSCULAR; INTRAVENOUS at 15:49

## 2018-06-02 RX ADMIN — BENZOCAINE, MENTHOL 1 LOZENGE: 15; 3.6 LOZENGE ORAL at 20:00

## 2018-06-02 RX ADMIN — PHENYLEPHRINE HYDROCHLORIDE 100 MCG: 10 INJECTION, SOLUTION INTRAMUSCULAR; INTRAVENOUS; SUBCUTANEOUS at 14:02

## 2018-06-02 RX ADMIN — CLINDAMYCIN PHOSPHATE 900 MG: 18 INJECTION, SOLUTION INTRAVENOUS at 12:05

## 2018-06-02 RX ADMIN — SODIUM CITRATE AND CITRIC ACID MONOHYDRATE 30 ML: 500; 334 SOLUTION ORAL at 10:42

## 2018-06-02 RX ADMIN — FENTANYL CITRATE 15 MCG: 50 INJECTION, SOLUTION INTRAMUSCULAR; INTRAVENOUS at 12:00

## 2018-06-02 RX ADMIN — HYDROMORPHONE HYDROCHLORIDE 0.5 MG: 1 INJECTION, SOLUTION INTRAMUSCULAR; INTRAVENOUS; SUBCUTANEOUS at 16:03

## 2018-06-02 RX ADMIN — MIDAZOLAM 1 MG: 1 INJECTION INTRAMUSCULAR; INTRAVENOUS at 12:55

## 2018-06-02 RX ADMIN — PROPOFOL 30 MG: 10 INJECTION, EMULSION INTRAVENOUS at 14:44

## 2018-06-02 RX ADMIN — ATROPINE SULFATE 0.4 MG: 0.4 INJECTION, SOLUTION INTRAMUSCULAR; INTRAVENOUS; SUBCUTANEOUS at 13:34

## 2018-06-02 RX ADMIN — CALCIUM CHLORIDE 250 MG: 100 INJECTION, SOLUTION INTRAVENOUS at 13:22

## 2018-06-02 RX ADMIN — PHENYLEPHRINE HYDROCHLORIDE 200 MCG: 10 INJECTION, SOLUTION INTRAMUSCULAR; INTRAVENOUS; SUBCUTANEOUS at 12:52

## 2018-06-02 RX ADMIN — GLYCOPYRROLATE 0.8 MG: 0.2 INJECTION, SOLUTION INTRAMUSCULAR; INTRAVENOUS at 15:10

## 2018-06-02 RX ADMIN — GENTAMICIN SULFATE 140 MG: 40 INJECTION, SOLUTION INTRAMUSCULAR; INTRAVENOUS at 18:54

## 2018-06-02 RX ADMIN — ROCURONIUM BROMIDE 20 MG: 10 INJECTION INTRAVENOUS at 13:20

## 2018-06-02 RX ADMIN — ONDANSETRON 4 MG: 2 INJECTION INTRAMUSCULAR; INTRAVENOUS at 12:09

## 2018-06-02 RX ADMIN — ACETAMINOPHEN 975 MG: 325 TABLET, FILM COATED ORAL at 18:52

## 2018-06-02 NOTE — ANESTHESIA PREPROCEDURE EVALUATION
Anesthesia Evaluation     .      No history of anesthetic complications          ROS/MED HX    ENT/Pulmonary:       Neurologic:       Cardiovascular:         METS/Exercise Tolerance:     Hematologic:         Musculoskeletal:         GI/Hepatic:         Renal/Genitourinary:         Endo:         Psychiatric:     (+) psychiatric history depression and anxiety      Infectious Disease:         Malignancy:         Other:                     Physical Exam      Airway     Dental     Cardiovascular       Pulmonary     Other findings: Complete placenta previa and hx of C/S x 3            Hx of preeclampsia with prior c-sections  Hx of blood transfusion with prior c-sections           Anesthesia Plan      History & Physical Review  History and physical reviewed and following examination; no interval change.    ASA Status:  3 .    NPO Status:  > 8 hours    Plan for Spinal, Epidural, General and Periph. Nerve Block for postop pain (Plan for CSE for  portion of surgery, followed by GETA for hysterectomy; TAP blocks for post-op pain control)   PONV prophylaxis:  Ondansetron (or other 5HT-3)       Postoperative Care  Postoperative pain management:  Peripheral nerve block (Single Shot), IV analgesics and Neuraxial analgesia.      Consents  Anesthetic plan, risks, benefits and alternatives discussed with:  Patient..

## 2018-06-02 NOTE — ANESTHESIA POSTPROCEDURE EVALUATION
Patient: Emi Yun    Procedure(s):   Hysterectomy, Bilateral Salpingectomy, Cystoscopy.  - Wound Class: I-Clean   - Wound Class: II-Clean Contaminated    Diagnosis:See Chart   Diagnosis Additional Information: No value filed.    Anesthesia Type:  Spinal, Epidural, General, Periph. Nerve Block for postop pain    Note:  Anesthesia Post Evaluation    Patient location during evaluation: PACU  Patient participation: Able to fully participate in evaluation  Level of consciousness: awake and alert  Pain management: adequate  Airway patency: patent  Cardiovascular status: acceptable  Respiratory status: acceptable  Hydration status: acceptable  PONV: none     Anesthetic complications: None          Last vitals:  Vitals:    18 1711 18 1743 18 1851   BP: 105/77 108/84 123/74   Pulse: 60 64 83   Resp: 14 14 16   Temp: 36.3  C (97.4  F) 36.3  C (97.3  F) 36.5  C (97.7  F)   SpO2: 99% 99% 99%         Electronically Signed By: Zamzam Lowe MD  2018  6:59 PM

## 2018-06-02 NOTE — PLAN OF CARE
Patient arrived to [facility] unit via gurney at 1710 accompanied by Main OR PACU RN (Infant was still on L&D and transferred up with L&D RN. Received report from PACU RN. Bands checked with mom and baby when baby was brought to room. Unit and room orientation given to patient and spouse. Call light within reach; no concerns present at this time. Continue with plan of care.

## 2018-06-02 NOTE — BRIEF OP NOTE
Brief Operative Note   Name: Emi Yun  MRN: [id number]  : 1985  Date of Surgery: 2018    Pre-operative Diagnosis:   IUP at 37w5d  Placenta previa with suspected accreta  Post-operative Diagnosis: Same  Procedure(s):  hysterectomy, bilateral salpingectomy, cystoscopy     Surgeon: Dr. Rain, Dr. Mace   Assistants: Katie Godinez, Ac Nelson MD    Anesthesia: Combined spinal epidural, GETA, TAPS block  EBL: 2500 mL     Specimens: Uterus, cervix, tubes, placenta   Complications: None apparent.  Findings: Viable female infant delivered at 1237 on 2018 with APGARs 9 & 9 and weight 2948g. Atonic uterus with thin serosal segment over portions of placenta. Normal tubes and ovaries. On cystoscopy, bilateral ureteral jets, no foreign body, no trauma.   Ac Nelson MD, PGY4  2018, 3:43 PM

## 2018-06-02 NOTE — ANESTHESIA PROCEDURE NOTES
Combined Spinal/Epidural procedure note    Staff  Anesthesiologist:  KRISTOFER QUEZADA  Location:  OR  Timeout  patient identified, IV checked, site marked, risks and benefits discussed, informed consent, monitors and equipment checked, pre-op evaluation, at physician/surgeon's request and post-op pain management    Correct Patient: Yes    Correct Position: Yes    Correct Site: Yes    Correct Procedure: Yes    Correct Laterality:  Yes  Site Marked:  Yes    Procedure details  Procedure:  Combined Spinal/Epidural (CSE)  Position:  Sitting  ASA:  3  Sterile Prep: chloraprep    Insertion site:  L3-4  Local skin infiltration:  1% lidocaine  Approach:  Midline  Epidural Needle Type:  Beatriz  Needle gauge (G):  17  Needle length (in):  3.5  Injection Technique:  LORT saline  CONNER at (cm):  6  Attempts:  1  Redirects:  0  Spinal Needle (G):  25  Spinal Needle Type:  Keiko  Spinal Needle Length (in):  4 11/16  Catheter gauge (G):  19  Catheter threaded easily: Yes    Threaded to skin (cm) :  11  Paresthesias:  No  CSF with Epidural needle: No    CSF with Spinal needle: Yes    Aspiration negative for Heme or CSF: Yes    Test dose (mL):  3  Local anesthetic for test dose:  Lidocaine 1.5% w/ 1:200,000 epinephrine  Test dose time:  12:07  Test dose negative for signs of intravascular, subdural or intrathecal injection: Yes

## 2018-06-02 NOTE — ANESTHESIA PROCEDURE NOTES
Peripheral Nerve Block Procedure Note    Staff:     Anesthesiologist:  KRISTOFER QUEZADA  Location: OR AFTER induction  Procedure Start/Stop TImes:     patient identified, IV checked, site marked, risks and benefits discussed, informed consent, monitors and equipment checked, pre-op evaluation, at physician/surgeon's request and post-op pain management      Correct Patient: Yes      Correct Position: Yes      Correct Site: Yes      Correct Procedure: Yes      Correct Laterality:  Yes    Site Marked:  Yes  Procedure details:     Procedure:  TAP    ASA:  3    Laterality:  Bilateral    Position:  Supine    Sterile Prep: chloraprep      Local skin infiltration:  None    Needle:  Short bevel    Needle gauge:  21    Needle length (mm):  110    Ultrasound: Yes      Ultrasound used to identify targeted nerve, plexus, or vascular structure and placed a needle adjacent to it      Permanent Image entered into patiient's record      Injection painful: patient under GA during block.      Blood Aspirated: No      : patient under GA during block.    Bleeding at site: No      Infusion Method:  Single Shot    Complications:  None
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
## 2018-06-02 NOTE — PLAN OF CARE
Problem: Patient Care Overview  Goal: Plan of Care/Patient Progress Review  Outcome: No Change  Pt alert, active, and stable. No signs or symptoms of distress. VSS. Pt denies bleeding, LOF, and pain. Rested well through the night. Pt aware of NPO status since 0115. Plan for C/S today. No questions or concerns at this time. Will continue to monitor closely. See flowsheet for FHR interpretation.

## 2018-06-02 NOTE — PROVIDER NOTIFICATION
Page to Dr. Nelson as pt. Has two elevated B/P and she has a hx of Pre-E.  Lab here had them draw a LFT incase it is requested by MD

## 2018-06-02 NOTE — PROGRESS NOTES
"[facility] Antepartum Progress Note  2018        Subjective:   Celena is without complaints this AM.  States she feels ready for surgery today.  She denies LOF, VB or contractions.  States baby has been active.        Objective:     BP (!) 132/93  Temp 98.4  F (36.9  C) (Oral)  Resp 18  Ht 1.727 m (5' 8\")  Wt 95.3 kg (210 lb)  LMP 2017  BMI 31.93 kg/m2     Gen: Resting comfortably in bed, NAD  CV: Regular rate  Resp: Normal respiratory effort   Ext: warm, well-perfused, no edema    Please see \"Imaging\" tab under \"Chart Review\" for details of today's ultrasound.    EFM: 120's, moderate variability, + accels, no decels      Assessment/Plan:   Emi Yun is a  at 37w5d admitted with placenta previa in context of 3 prior CS.        I reviewed today's US with Celena that demonstrated complete central placenta previa with anterior portion of placenta covering likely area of prior hysterotomy.  There is increased vascularity in the right lateral aspect of the uterus and the hypoechoic uteroplacental interface at the left lateral aspect was not visualized.  I reviewed with Celena that the US findings would correlate with an intermediate risk for placenta accreta, but that her prior OB history confers a higher risk (60% risk) of morbidly adherent placenta at the time of her 4th  section.  Celena states that she and her  have discussed this overnight and continue to feel comfortable with plan to proceed directly to hysterectomy after  delivery with the placenta left in situ.  We again reviewed the possibility that pathological examination may not demonstrate presence of placenta accreta, but that we will not know this until after delivery.  Celena expressed her understanding and acceptance of this information and informed consent to proceed with  hysterectomy via vertical midline incision with the placenta left in situ was obtained.  We will also attempt to perform salpingectomy " if technically feasible.  Will plan to have Gyn Oncology present for hysterectomy today.  Anesthesia notified.    Given her history of Oxycodone 10 mg daily use, anticipate that she may require higher dose narcotics post-op for pain control and will also request that NICU attend delivery, as general anesthesia may be required and baby will need monitoring for ALY.    Darrell Orozco    Time Spent on this Encounter   I, Darrell Orozco, spent a total of 30 minutes bedside and on the inpatient unit today managing the care of Emi Yun.  Over 50% of my time on the unit was spent counseling the patient and /or coordinating care regarding pregnancy complicated by placenta previa in context of 3 prior CS. See note for details.    Darrell Orozco

## 2018-06-03 LAB
ABO + RH BLD: NORMAL
ABO + RH BLD: NORMAL
ANION GAP SERPL CALCULATED.3IONS-SCNC: 6 MMOL/L (ref 3–14)
BLD GP AB SCN SERPL QL: NORMAL
BLD PROD TYP BPU: NORMAL
BLD PROD TYP BPU: NORMAL
BLD UNIT ID BPU: 0
BLOOD BANK CMNT PATIENT-IMP: NORMAL
BLOOD PRODUCT CODE: NORMAL
BPU ID: NORMAL
BUN SERPL-MCNC: 13 MG/DL (ref 7–30)
CALCIUM SERPL-MCNC: 8.1 MG/DL (ref 8.5–10.1)
CHLORIDE SERPL-SCNC: 108 MMOL/L (ref 94–109)
CO2 SERPL-SCNC: 25 MMOL/L (ref 20–32)
CREAT SERPL-MCNC: 0.75 MG/DL (ref 0.52–1.04)
ERYTHROCYTE [DISTWIDTH] IN BLOOD BY AUTOMATED COUNT: 16.7 % (ref 10–15)
ERYTHROCYTE [DISTWIDTH] IN BLOOD BY AUTOMATED COUNT: 16.9 % (ref 10–15)
GFR SERPL CREATININE-BSD FRML MDRD: 89 ML/MIN/1.7M2
GLUCOSE SERPL-MCNC: 89 MG/DL (ref 70–99)
HCT VFR BLD AUTO: 24.6 % (ref 35–47)
HCT VFR BLD AUTO: 25.2 % (ref 35–47)
HGB BLD-MCNC: 7.7 G/DL (ref 11.7–15.7)
HGB BLD-MCNC: 7.8 G/DL (ref 11.7–15.7)
MCH RBC QN AUTO: 25.5 PG (ref 26.5–33)
MCH RBC QN AUTO: 26.1 PG (ref 26.5–33)
MCHC RBC AUTO-ENTMCNC: 31 G/DL (ref 31.5–36.5)
MCHC RBC AUTO-ENTMCNC: 31.3 G/DL (ref 31.5–36.5)
MCV RBC AUTO: 82 FL (ref 78–100)
MCV RBC AUTO: 84 FL (ref 78–100)
NUM BPU REQUESTED: 5
PLATELET # BLD AUTO: 212 10E9/L (ref 150–450)
PLATELET # BLD AUTO: 223 10E9/L (ref 150–450)
POTASSIUM SERPL-SCNC: 4.5 MMOL/L (ref 3.4–5.3)
RBC # BLD AUTO: 2.99 10E12/L (ref 3.8–5.2)
RBC # BLD AUTO: 3.02 10E12/L (ref 3.8–5.2)
SODIUM SERPL-SCNC: 139 MMOL/L (ref 133–144)
SPECIMEN EXP DATE BLD: NORMAL
TRANSFUSION STATUS PATIENT QL: NORMAL
TRANSFUSION STATUS PATIENT QL: NORMAL
WBC # BLD AUTO: 10.3 10E9/L (ref 4–11)
WBC # BLD AUTO: 9.4 10E9/L (ref 4–11)

## 2018-06-03 PROCEDURE — 25000125 ZZHC RX 250: Performed by: OBSTETRICS & GYNECOLOGY

## 2018-06-03 PROCEDURE — 36415 COLL VENOUS BLD VENIPUNCTURE: CPT | Performed by: OBSTETRICS & GYNECOLOGY

## 2018-06-03 PROCEDURE — 25000128 H RX IP 250 OP 636: Performed by: OBSTETRICS & GYNECOLOGY

## 2018-06-03 PROCEDURE — 85027 COMPLETE CBC AUTOMATED: CPT | Performed by: STUDENT IN AN ORGANIZED HEALTH CARE EDUCATION/TRAINING PROGRAM

## 2018-06-03 PROCEDURE — 25000132 ZZH RX MED GY IP 250 OP 250 PS 637: Performed by: STUDENT IN AN ORGANIZED HEALTH CARE EDUCATION/TRAINING PROGRAM

## 2018-06-03 PROCEDURE — 25000132 ZZH RX MED GY IP 250 OP 250 PS 637: Performed by: OBSTETRICS & GYNECOLOGY

## 2018-06-03 PROCEDURE — 36415 COLL VENOUS BLD VENIPUNCTURE: CPT | Performed by: STUDENT IN AN ORGANIZED HEALTH CARE EDUCATION/TRAINING PROGRAM

## 2018-06-03 PROCEDURE — 12000030 ZZH R&B OB INTERMEDIATE UMMC

## 2018-06-03 PROCEDURE — 90715 TDAP VACCINE 7 YRS/> IM: CPT | Performed by: OBSTETRICS & GYNECOLOGY

## 2018-06-03 PROCEDURE — P9016 RBC LEUKOCYTES REDUCED: HCPCS | Performed by: OBSTETRICS & GYNECOLOGY

## 2018-06-03 PROCEDURE — 80048 BASIC METABOLIC PNL TOTAL CA: CPT | Performed by: OBSTETRICS & GYNECOLOGY

## 2018-06-03 PROCEDURE — 85027 COMPLETE CBC AUTOMATED: CPT | Performed by: OBSTETRICS & GYNECOLOGY

## 2018-06-03 RX ORDER — FERROUS SULFATE 325(65) MG
325 TABLET ORAL
Qty: 90 TABLET | Refills: 1 | Status: SHIPPED | OUTPATIENT
Start: 2018-06-03

## 2018-06-03 RX ORDER — IBUPROFEN 600 MG/1
600 TABLET, FILM COATED ORAL EVERY 6 HOURS PRN
Qty: 40 TABLET | Refills: 0 | Status: SHIPPED | OUTPATIENT
Start: 2018-06-03

## 2018-06-03 RX ORDER — OXYCODONE AND ACETAMINOPHEN 5; 325 MG/1; MG/1
1 TABLET ORAL EVERY 6 HOURS PRN
Qty: 12 TABLET | Refills: 0 | Status: SHIPPED | OUTPATIENT
Start: 2018-06-03

## 2018-06-03 RX ORDER — IBUPROFEN 600 MG/1
600 TABLET, FILM COATED ORAL EVERY 6 HOURS PRN
Status: DISCONTINUED | OUTPATIENT
Start: 2018-06-03 | End: 2018-06-05 | Stop reason: HOSPADM

## 2018-06-03 RX ORDER — AMOXICILLIN 250 MG
1-2 CAPSULE ORAL 2 TIMES DAILY PRN
Qty: 60 TABLET | Refills: 0 | Status: SHIPPED | OUTPATIENT
Start: 2018-06-03

## 2018-06-03 RX ADMIN — IBUPROFEN 600 MG: 600 TABLET ORAL at 22:28

## 2018-06-03 RX ADMIN — ACETAMINOPHEN 975 MG: 325 TABLET, FILM COATED ORAL at 20:47

## 2018-06-03 RX ADMIN — ACETAMINOPHEN 975 MG: 325 TABLET, FILM COATED ORAL at 12:57

## 2018-06-03 RX ADMIN — CLINDAMYCIN PHOSPHATE 900 MG: 18 INJECTION, SOLUTION INTRAVENOUS at 05:09

## 2018-06-03 RX ADMIN — GENTAMICIN SULFATE 140 MG: 40 INJECTION, SOLUTION INTRAMUSCULAR; INTRAVENOUS at 16:47

## 2018-06-03 RX ADMIN — OXYCODONE HYDROCHLORIDE 10 MG: 5 TABLET ORAL at 20:47

## 2018-06-03 RX ADMIN — KETOROLAC TROMETHAMINE 30 MG: 30 INJECTION, SOLUTION INTRAMUSCULAR at 06:52

## 2018-06-03 RX ADMIN — KETOROLAC TROMETHAMINE 30 MG: 30 INJECTION, SOLUTION INTRAMUSCULAR at 12:57

## 2018-06-03 RX ADMIN — ACETAMINOPHEN 975 MG: 325 TABLET, FILM COATED ORAL at 02:44

## 2018-06-03 RX ADMIN — SODIUM CHLORIDE, POTASSIUM CHLORIDE, SODIUM LACTATE AND CALCIUM CHLORIDE: 600; 310; 30; 20 INJECTION, SOLUTION INTRAVENOUS at 03:50

## 2018-06-03 RX ADMIN — CLINDAMYCIN PHOSPHATE 900 MG: 18 INJECTION, SOLUTION INTRAVENOUS at 13:03

## 2018-06-03 RX ADMIN — OXYCODONE HYDROCHLORIDE 10 MG: 5 TABLET ORAL at 17:36

## 2018-06-03 RX ADMIN — OXYCODONE HYDROCHLORIDE 5 MG: 5 TABLET ORAL at 02:44

## 2018-06-03 RX ADMIN — SENNOSIDES AND DOCUSATE SODIUM 2 TABLET: 8.6; 5 TABLET ORAL at 19:37

## 2018-06-03 RX ADMIN — SENNOSIDES AND DOCUSATE SODIUM 1 TABLET: 8.6; 5 TABLET ORAL at 08:39

## 2018-06-03 RX ADMIN — KETOROLAC TROMETHAMINE 30 MG: 30 INJECTION, SOLUTION INTRAMUSCULAR at 00:56

## 2018-06-03 RX ADMIN — GENTAMICIN SULFATE 140 MG: 40 INJECTION, SOLUTION INTRAMUSCULAR; INTRAVENOUS at 03:55

## 2018-06-03 RX ADMIN — BENZOCAINE, MENTHOL 1 LOZENGE: 15; 3.6 LOZENGE ORAL at 00:56

## 2018-06-03 RX ADMIN — CLOSTRIDIUM TETANI TOXOID ANTIGEN (FORMALDEHYDE INACTIVATED), CORYNEBACTERIUM DIPHTHERIAE TOXOID ANTIGEN (FORMALDEHYDE INACTIVATED), BORDETELLA PERTUSSIS TOXOID ANTIGEN (GLUTARALDEHYDE INACTIVATED), BORDETELLA PERTUSSIS FILAMENTOUS HEMAGGLUTININ ANTIGEN (FORMALDEHYDE INACTIVATED), BORDETELLA PERTUSSIS PERTACTIN ANTIGEN, AND BORDETELLA PERTUSSIS FIMBRIAE 2/3 ANTIGEN 0.5 ML: 5; 2; 2.5; 5; 3; 5 INJECTION, SUSPENSION INTRAMUSCULAR at 12:57

## 2018-06-03 RX ADMIN — OXYCODONE HYDROCHLORIDE 10 MG: 5 TABLET ORAL at 08:39

## 2018-06-03 RX ADMIN — SODIUM CHLORIDE, POTASSIUM CHLORIDE, SODIUM LACTATE AND CALCIUM CHLORIDE 1000 ML: 600; 310; 30; 20 INJECTION, SOLUTION INTRAVENOUS at 12:57

## 2018-06-03 NOTE — PROVIDER NOTIFICATION
06/03/18 0350   Provider Notification   Provider Name/Title Dr. Acevedo   Should IV clinda and gent continue? Pt stillhas pre-op orders.

## 2018-06-03 NOTE — PROVIDER NOTIFICATION
Pt helped up to bathroom for pericares. Pt walked back into bed and stated to nurse that she felt like she expelled a large blood clot. Pt walked back to bathroom and when pad changed, a softball size blood clot came out. Blood clot weighed, 38gms. Text paged and updated Dr. Arias/G3.     G2 Called back and just monitor for now. Update with any further clots.

## 2018-06-03 NOTE — PROGRESS NOTES
GYN ONC Fellow    No acute events overnight.  Crowley in place, tolerating regular diet, denies N/V/CP/SOB/dizziness/lightheadedness. Min lochia, flatus pending.  Not yet out of bed.  Pain controlled.    Vitals:    18 2102 18 2204 18 0100 18 0427   BP: 118/82 117/74 105/75 95/57   Pulse: 79 80 76 69   Resp: 14 14 16 16   Temp: 97.7  F (36.5  C) 97.7  F (36.5  C) 98.2  F (36.8  C) 97.9  F (36.6  C)   TempSrc: Oral  Oral Oral   SpO2: 98% 97% 98% 98%   Weight:       Height:           Intake/Output Summary (Last 24 hours) at 18 0810  Last data filed at 18 0700   Gross per 24 hour   Intake             4920 ml   Output             3481 ml   Net             1439 ml     UOP: ~28 cc/hr overnight    NAD  RRR  CTAB  abd soft, minimally TTP, no rebound  Incision c/d/i with staples  extr NTTP, no erythema, +1 edema b/l    CBC RESULTS:   Recent Labs   Lab Test  18   0720   WBC  10.3   RBC  3.02*   HGB  7.7*   HCT  24.6*   MCV  82   MCH  25.5*   MCHC  31.3*   RDW  16.7*   PLT  223     A/P: 33 yo POD1 s/p  hysterectomy and bilateral salpingectomy for suspected accreta, doing well overall.  1.  POD1: Continue routine postop care, pain controlled, ambulate today, staples in place x2 weeks  2.  Asymptomatic anemia: EBL 2.5L, s/p 2U pRBC intraop; Hgb previously ~9 myrtle and postop, likely equilibrating this AM; will repeat at noon. UOP borderline, continue IVF, keep Crowley in place for close I/O until Hgb stable

## 2018-06-03 NOTE — PLAN OF CARE
Problem: Patient Care Overview  Goal: Plan of Care/Patient Progress Review  Outcome: Improving  VSS. Postpartum assessment WNL. C/o incisional pain, specifically at the top of her incision. Pain managed with abdominal binder, heat packs, toradol, tylenol, and oxycodone. Scant amount of drainage noted as shadowing at the top left side of her dressing. Scant bleeding noted. Pt tolerating regular diet. Not passing flatus at this time. EDS of 5. Encouraged pt to watch educational videos and complete birth certificate. Pt choosing to formula feed so breast pump not needed. Pt holding and feeding baby, bonding well and attentive to needs. Spouse present and attentive. Pt said she would like TDAP before discharge as she did not get one this pregnancy. CBC x2 this evening shows hemoglobin of 10.6 then 9.4. Will continue to monitor hemoglobin and assess for need for RBCs. Denies any s/s of anemia at this time.

## 2018-06-03 NOTE — PROGRESS NOTES
Went to visit with patient in the setting of concern for low UOP. In discussion with RN, urine had just not been emptied for sometime and upon further review is adequate with 1650 cc out this shift. BMP collected and unconcerning with creatinine of 0.75. HGB repeat stable at 7.8 but patient continues to be symptomatic with significant fatigue. VSS. No evidence for ongoing bleeding. Abdomen soft, appropriately tender, no distention. Will plan to transfuse 1 u pRBCs given symptomatic anemia and repeat HGB in AM. Can COLIN thomas now given adequate UOP and stable HGB.     Priyanka Arias PGY3  6/3/2018 2:24 PM

## 2018-06-03 NOTE — PLAN OF CARE
Problem: Patient Care Overview  Goal: Plan of Care/Patient Progress Review  Outcome: Improving  Data: Vital signs within normal limits. Postpartum checks within normal limits - see flow record. Patient eating and drinking normally, denies any nausea. Crowley intact, minimal output, informed Dr. Yates regarding urine output this morning. No apparent signs of infection. Dressing over incision is marked but no extention noted. Patient denies any dizziness or lightheadedness. Left IV infiltrated this morning at 0650, IV removed and right IV now infusing with lactated ringers.   Action: Pain has been adequately managed with Tylenol, Ibuprofen, and Oxycodone. Patient states abdominal binder is helping a lot.   Response: Positive attachment behaviors observed with infant. Support person, Judd, present.   Plan: Continue with the plan of care.

## 2018-06-03 NOTE — PROGRESS NOTES
OB/GYN Daily Progress Note, POD#1    S: Ms. Yun is feeling well overall ok. Pain controlled on oral regimen. She has no nausea or vomiting, tolerating a regular diet. Crowley in place. No ambulation as of yet. Plans to bottlefeed. Scant vaginal bleeding.     O:   VS:   Patient Vitals for the past 24 hrs:   BP Temp Temp src Pulse Heart Rate Resp SpO2   06/03/18 0813 104/62 97.5  F (36.4  C) Oral 60 - 20 99 %   06/03/18 0427 95/57 97.9  F (36.6  C) Oral 69 - 16 98 %   06/03/18 0100 105/75 98.2  F (36.8  C) Oral 76 - 16 98 %   06/02/18 2204 117/74 97.7  F (36.5  C) - 80 - 14 97 %   06/02/18 2102 118/82 97.7  F (36.5  C) Oral 79 - 14 98 %   06/02/18 1956 110/74 97.4  F (36.3  C) - 75 - 16 98 %   06/02/18 1851 123/74 97.7  F (36.5  C) - 83 - 16 99 %   06/02/18 1743 108/84 97.3  F (36.3  C) Oral 64 - 14 99 %   06/02/18 1711 105/77 97.4  F (36.3  C) - 60 - 14 99 %   06/02/18 1645 112/45 - - - 59 11 99 %   06/02/18 1644 - - - - - - 99 %   06/02/18 1630 103/67 97.5  F (36.4  C) Oral - 60 13 100 %   06/02/18 1615 109/87 - - - 60 11 99 %   06/02/18 1600 112/88 - - - 61 12 100 %   06/02/18 1545 109/79 97.7  F (36.5  C) Oral - 63 8 100 %   06/02/18 1530 112/83 - - - 75 12 100 %   06/02/18 1519 105/76 97.5  F (36.4  C) Oral - - 12 100 %   06/02/18 0950 137/86 - - - - - -   06/02/18 0927 (!) 132/93 98.4  F (36.9  C) Oral - - - -   General: resting in bed, in NAD  CV: Regular rate  Resp: breathing comfortably on room air   Abdomen: soft, appropriately tender, nondistended  Incision: midline, 1x small area of shadowing, marked with no extension beyond the borders  Peripad: scant blood on pad  Extremities: non-tender, non-edematous, SCDs in place      Recent Labs  Lab 06/02/18  2042 06/02/18  1803 06/02/18  1332 06/02/18  0003   HGB 9.4* 10.6* 9.8* 10.6*     Component      Latest Ref Rng & Units 6/2/2018   WBC      4.0 - 11.0 10e9/L 17.6 (H)   RBC Count      3.8 - 5.2 10e12/L 3.59 (L)   Hemoglobin      11.7 - 15.7 g/dL 9.4 (L)    Hematocrit      35.0 - 47.0 % 29.8 (L)   MCV      78 - 100 fl 83   MCH      26.5 - 33.0 pg 26.2 (L)   MCHC      31.5 - 36.5 g/dL 31.5   RDW      10.0 - 15.0 % 16.8 (H)   Platelet Count      150 - 450 10e9/L 241   Fibrinogen      200 - 420 mg/dL 399   INR      0.86 - 1.14 1.07   PTT      22 - 37 sec 28       A: Ms. Yun is a 32 year old now P4, POD #1 s/p  hysterectomy, bilateral salpingectomy, cystoscopy for placenta previa/accreta. Her procedure was complicated by postpartum hemorrhage with EBL of 2.5L. Doing well in the postpartum course.     P:  Continue routine pp cares  Heme 10.6 > EBL 2.5L > 9.8 (intra-op) > 2U pRBCs (intraop) > 10.6 > 9.4> 7.7, coags nl, plan to recheck CBC at noon  Plan for 24hrs gent/clinda given prolonged surgery and high EBL   Hx of chronic oxycodone use (~10mg/day): pain regimen of TAPs blocks, tylenol, toradol > ibuprofen, oxycodone   GI: tolerating regular diet, prn antiemetics and stool softeners  Feeding: bottle   Contraception: none required - c/hyst  Rh positive, Rubella pending   Disposition: routine PP cares, anticipate d/c POD#3 likely, once discharge goals are obtained    Danita Acevedo MD  OBGYN Resident, PGY4    OB/GYN Staff -- Pt seen and examined by me. Agree with note as above. Currently with asymptomatic anemia.  MD Autumn

## 2018-06-03 NOTE — PROGRESS NOTES
"OB/GYN Post-op Check, POD#0    S: Ms. Yun is feeling well overall ok. Her throat is sore. She is having abdominal pain, like she is \"still cut open.\" She just received some tylenol and is going to try an oxycodone tablet now that she has had a few crackers.  She has no nausea or vomiting. Crowley in place. Plans to bottlefeed. Denies any symptoms of headache, chest pain, shortness of breath, blurry vision. She is still just feeling groggy from the anesthesia. Per RN, pt had a single dimesized blood clot on her pad when she came to postpartum.     O:   VS:   Patient Vitals for the past 24 hrs:   BP Temp Temp src Pulse Heart Rate Resp SpO2 Height Weight   06/02/18 1851 123/74 97.7  F (36.5  C) - 83 - 16 99 % - -   06/02/18 1743 108/84 97.3  F (36.3  C) Oral 64 - 14 99 % - -   06/02/18 1711 105/77 97.4  F (36.3  C) - 60 - 14 99 % - -   06/02/18 1645 112/45 - - - 59 11 99 % - -   06/02/18 1644 - - - - - - 99 % - -   06/02/18 1630 103/67 97.5  F (36.4  C) Oral - 60 13 100 % - -   06/02/18 1615 109/87 - - - 60 11 99 % - -   06/02/18 1600 112/88 - - - 61 12 100 % - -   06/02/18 1545 109/79 97.7  F (36.5  C) Oral - 63 8 100 % - -   06/02/18 1530 112/83 - - - 75 12 100 % - -   06/02/18 1519 105/76 97.5  F (36.4  C) Oral - - 12 100 % - -   06/02/18 0950 137/86 - - - - - - - -   06/02/18 0927 (!) 132/93 98.4  F (36.9  C) Oral - - - - - -   06/02/18 0540 125/62 98.1  F (36.7  C) Oral - - 18 - - -   06/02/18 0114 125/65 - - - - - - - -   06/01/18 2213 - - - - - - - 1.727 m (5' 8\") 95.3 kg (210 lb)   06/01/18 2202 134/84 98.2  F (36.8  C) Oral - - 18 - - -   General: resting in bed, in NAD  CV: Regular rate  Resp: breathing comfortably on room air   Abdomen: soft, appropriately tender, nondistended  Incision: midline, 1x small area of shadowing, marked with no extension beyond the borders  Peripad: scant blood on pad  Extremities: non-tender, non-edematous, SCDs in place      Recent Labs  Lab 06/02/18  1803 06/02/18  1332 " 18  0003   HGB 10.6* 9.8* 10.6*       A: Ms. Yun is a 32 year old now P4, POD #0 s/p  hysterectomy, bilateral salpingectomy, cystoscopy for placenta previa/accreta. Her procedure was complicated by postpartum hemorrhage with EBL of 2.5L. Stable in the immediate post-op period.     P:  Continue routine pp cares  Heme 10.6 > EBL 2.5L > 9.8 (intra-op) > 2U pRBCs (intraop) > 10.6, plan to recheck CBC and coags at 2000 this evening, anticipate she will need further blood products    Plan for 24hrs gent/clinda given prolonged surgery and high EBL   Hx of chronic oxycodone use (~10mg/day): pain regimen of TAPs blocks, tylenol, toradol > ibuprofen, oxycodone   GI: tolerating regular diet, prn antiemetics and stool softeners  Feeding: bottle   Contraception: none required - c/hyst  Rh positive, Rubella pending   Disposition: routine PP cares, anticipate d/c POD#3 likely, once discharge goals are obtained    Danita Acevedo MD  OBGYN Resident, PGY4

## 2018-06-03 NOTE — PLAN OF CARE
Problem: Patient Care Overview  Goal: Plan of Care/Patient Progress Review  Outcome: Improving  VSS. AFebrile. Tired this am but denies dizziness when up to bathroom with 1 assist. Hemoglobin rechecked and due to continued fatigue. Gave 1 unit of PRBCs. Crowley discontinued. Pt c/o pain and medicated with relief. Tolerating regular diet with issues. FOB here and supportive, doing most of the baby cares while pt rests. Drsg applied this am and c/d/i. Pneumoboots on. TDAP given to pt. Will continue to monitor.

## 2018-06-04 LAB
ERYTHROCYTE [DISTWIDTH] IN BLOOD BY AUTOMATED COUNT: 16.7 % (ref 10–15)
HBV SURFACE AB SERPL IA-ACNC: >1000 M[IU]/ML
HCT VFR BLD AUTO: 25.2 % (ref 35–47)
HCV AB SERPL QL IA: NONREACTIVE
HGB BLD-MCNC: 7.9 G/DL (ref 11.7–15.7)
HIV 1+2 AB+HIV1 P24 AG SERPL QL IA: NONREACTIVE
MCH RBC QN AUTO: 25.9 PG (ref 26.5–33)
MCHC RBC AUTO-ENTMCNC: 31.3 G/DL (ref 31.5–36.5)
MCV RBC AUTO: 83 FL (ref 78–100)
PLATELET # BLD AUTO: 205 10E9/L (ref 150–450)
RBC # BLD AUTO: 3.05 10E12/L (ref 3.8–5.2)
RUBV IGG SERPL IA-ACNC: 19 IU/ML
WBC # BLD AUTO: 8.8 10E9/L (ref 4–11)

## 2018-06-04 PROCEDURE — 12000028 ZZH R&B OB UMMC

## 2018-06-04 PROCEDURE — 36415 COLL VENOUS BLD VENIPUNCTURE: CPT | Performed by: OBSTETRICS & GYNECOLOGY

## 2018-06-04 PROCEDURE — 25000132 ZZH RX MED GY IP 250 OP 250 PS 637: Performed by: STUDENT IN AN ORGANIZED HEALTH CARE EDUCATION/TRAINING PROGRAM

## 2018-06-04 PROCEDURE — 25000132 ZZH RX MED GY IP 250 OP 250 PS 637: Performed by: OBSTETRICS & GYNECOLOGY

## 2018-06-04 PROCEDURE — 85027 COMPLETE CBC AUTOMATED: CPT | Performed by: OBSTETRICS & GYNECOLOGY

## 2018-06-04 RX ADMIN — OXYCODONE HYDROCHLORIDE 10 MG: 5 TABLET ORAL at 14:21

## 2018-06-04 RX ADMIN — ACETAMINOPHEN 975 MG: 325 TABLET, FILM COATED ORAL at 04:54

## 2018-06-04 RX ADMIN — ACETAMINOPHEN 975 MG: 325 TABLET, FILM COATED ORAL at 20:43

## 2018-06-04 RX ADMIN — ACETAMINOPHEN 975 MG: 325 TABLET, FILM COATED ORAL at 12:45

## 2018-06-04 RX ADMIN — OXYCODONE HYDROCHLORIDE 10 MG: 5 TABLET ORAL at 03:42

## 2018-06-04 RX ADMIN — OXYCODONE HYDROCHLORIDE 5 MG: 5 TABLET ORAL at 06:33

## 2018-06-04 RX ADMIN — SENNOSIDES AND DOCUSATE SODIUM 2 TABLET: 8.6; 5 TABLET ORAL at 20:43

## 2018-06-04 RX ADMIN — OXYCODONE HYDROCHLORIDE 5 MG: 5 TABLET ORAL at 20:43

## 2018-06-04 RX ADMIN — OXYCODONE HYDROCHLORIDE 5 MG: 5 TABLET ORAL at 23:51

## 2018-06-04 RX ADMIN — OXYCODONE HYDROCHLORIDE 5 MG: 5 TABLET ORAL at 17:03

## 2018-06-04 RX ADMIN — IBUPROFEN 600 MG: 600 TABLET ORAL at 11:15

## 2018-06-04 RX ADMIN — IBUPROFEN 600 MG: 600 TABLET ORAL at 04:54

## 2018-06-04 RX ADMIN — SENNOSIDES AND DOCUSATE SODIUM 2 TABLET: 8.6; 5 TABLET ORAL at 08:37

## 2018-06-04 RX ADMIN — IBUPROFEN 600 MG: 600 TABLET ORAL at 22:50

## 2018-06-04 RX ADMIN — OXYCODONE HYDROCHLORIDE 10 MG: 5 TABLET ORAL at 11:15

## 2018-06-04 RX ADMIN — IBUPROFEN 600 MG: 600 TABLET ORAL at 17:03

## 2018-06-04 NOTE — PROVIDER NOTIFICATION
06/04/18 1425   Provider Notification   Provider Name/Title Shabana MON   Method of Notification Electronic Page   Request Evaluate-Remote   Notification Reason Other   Pt would like IV to be taken out. She feels much better today, asymptomatic. Showered and walked the halls today. Is it ok to remove?

## 2018-06-04 NOTE — PLAN OF CARE
"Problem: Patient Care Overview  Goal: Plan of Care/Patient Progress Review  Outcome: Improving  VSS. Bonding well with baby. Formula feeding baby 30ml q3h independently. Ambulating independently; walked around unit tonight. Voiding without difficulty. Incisional pain controlled with oxycodone, tylenol, and ibuprofen. Pt states \"feeling less tired\" since blood transfusion yesterday afternoon. Dad at bedside for support. Continue with plan of care.       "
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
## 2018-06-04 NOTE — PROVIDER NOTIFICATION
06/04/18 0951   Provider Notification   Provider Name/Title Shabana G2    Method of Notification Electronic Page   Request Evaluate-Remote   Notification Reason Lab Results   Do you know what patient's Rubella, GBS, HIV, and HepB labs are? If not would you like to order labs to be drawn?

## 2018-06-04 NOTE — PLAN OF CARE
Problem: Patient Care Overview  Goal: Plan of Care/Patient Progress Review  Outcome: Improving  Patient has been stable. Vitals WDL. States that she is voiding without difficulties. Took a shower this morning and took dressing off. Incision is clean, dry, intact and open to air. Closed with staples. Patient is wearing an abdominal binder. Scant bleeding, declines clots. Hemoglobin this morning 7.9 asymptomatic, patient states she feels much better than she felt yesterday and declines headache or dizziness. IV removed. Taking ibuprofen, tylenol, and oxycodone for pain management.

## 2018-06-04 NOTE — PROGRESS NOTES
OB/GYN Daily Progress Note, POD#2    S: POD#2  hysterectomy. Emi feels well, some pain still but it doesn't keep her form moving around or taking care of herself and her baby. She had a golf ball sized clot last night and absolutely no bleeding since then. Voiding urine easily. Eating full meals without nausea or vomiting. Passing gas, no bowel movement. Endorses some chest discomfort yesterday that resolved with blood transfusion.     O:   VS:   Patient Vitals for the past 24 hrs:   BP Temp Temp src Pulse Resp SpO2   18 2344 122/72 97.9  F (36.6  C) Oral 63 18 -   18 1936 123/75 97.8  F (36.6  C) Oral 70 18 -   18 1700 126/71 98  F (36.7  C) Oral 65 20 98 %   18 1555 123/65 98  F (36.7  C) Oral 66 20 99 %   18 1500 115/68 98.1  F (36.7  C) Oral 70 18 97 %   18 1441 113/63 97.9  F (36.6  C) Oral 68 20 97 %   18 1200 110/64 97.6  F (36.4  C) Oral 63 20 99 %   18 0813 104/62 97.5  F (36.4  C) Oral 60 20 99 %   18 0427 95/57 97.9  F (36.6  C) Oral 69 16 98 %     General: standing at cribside, appears comfortable  CV: RRR no murmur  Resp: CTAB  Abdomen: soft, appropriately tender especially R upper incision, minimally distended  Incision: midline, 1x small area of shadowing, marked with no extension beyond the borders  Extremities: non-tender, non-edematous    A: Ms. Yun is a 32 year old now P4, POD#2 s/p  hysterectomy, bilateral salpingectomy, cystoscopy for placenta previa/accreta. Her procedure was complicated by intraprocedural hemorrhage with EBL of 2.5L. Doing well in the postpartum course.     P:  Continue routine pp cares  Heme 10.6 > EBL 2.5L > 9.8 (intra-op) > 2U pRBCs (intraop) > 10.6 > 9.4> 7.7 > 7.8 > 1u pRBC > am   S/p 24hrs gent/clinda given prolonged surgery and high EBL   Hx of chronic oxycodone use (~10mg/day): pain regimen of TAPs blocks, tylenol, toradol > ibuprofen, oxycodone   GI: tolerating regular diet, prn antiemetics  and stool softeners  Feeding: bottle   Contraception: none required - c/hyst  Rh positive, Rubella pending   Disposition: routine PP cares, anticipate d/c POD#3 likely, once discharge goals are obtained    Bel Li, PGY3  OB/Gyn  6/4/2018, 2:39 AM        Appreciate note by Dr. Li. Patient has been seen and examined by me separate from the resident, agree with above note.   Hemoglobin   Date Value Ref Range Status   06/04/2018 7.9 (L) 11.7 - 15.7 g/dL Final       Mickie Jacobs MD  1:41 PM

## 2018-06-04 NOTE — PLAN OF CARE
Problem: Patient Care Overview  Goal: Plan of Care/Patient Progress Review  Outcome: Improving  Patient's postpartum assessment WDL, vital signs stable. Classical incision has staples and is WDL. Independent with bottle (formula) feeding infant. Bonding well with infant. Taking ibuprofen, tylenol, and roxicodone for pain control. Ambulated in halls and showered today. Hgb 7.9, patient asymptomatic and SL removed (per Dr. Jacobs). Voiding without difficulty and is passing gas.

## 2018-06-04 NOTE — PROVIDER NOTIFICATION
06/03/18 1917   Provider Notification   Provider Name/Title Amira MON   Method of Notification Electronic Page   Request Evaluate-Remote   Notification Reason Medication Request  (Can you place order for ibuprofen?)   Update: 2030 Amira placed ibuprofen order

## 2018-06-04 NOTE — OP NOTE
Procedure Date: 2018      PREOPERATIVE DIAGNOSIS:  Likely placenta accreta.      POSTOPERATIVE DIAGNOSIS:  Likely placenta accreta.      PROCEDURE:  Hysterectomy, bilateral salpingectomy and cystoscopy.      ATTENDING:  Benja Mace MD      ASSISTING:  Nba       ANESTHESIA:  None.      ESTIMATED BLOOD LOSS:  2500 mL.      INTRAVENOUS FLUIDS:  Include 2200 crystalloid, 250 albumin.      URINE OUTPUT:  Approximately 400 mL.      TUBES AND DRAINS:  Include Crowley.      DESCRIPTION OF PROCEDURE:  The patient at the time of our procedure had undergone a   section under the care of Dr. Rain.  The hysterotomy site had been closed and though there was some bleeding within the uterus the external bleeding had been controlled.  Attention was first turned to the fallopian tubes which were dissected off of the ovaries.  The round ligaments were then transected.  The retroperitoneum was entered and the ureters were identified bilaterally.  With the broad ligament nicely dissected and the ureters visible the utero-ovarian ligament and the attachments were clamped and transected, allowing the ovaries to be retained.  This was performed on both sides.  The bladder was then dissected inferiorly and there was some evidence of placenta near the bladder.  There was a small to moderate amount of bleeding at this time.  Nevertheless, the dissection could be carried without significant event.  The uterine arteries were then transected and suture ligated in the usual fashion.  Using a slow dissection, the bladder was continuously dissected inferiorly and the uterine arteries were continued to be skeletonized.  This was somewhat challenging in light of the distorted anatomy, however, ultimately it was felt that the resection was complete and uneventful.  Due to the length of the procedure, there was a steady amount of bleeding throughout but no points of significant hemorrhage.  There was a period of  hypotension during surgery which was felt to be possibly vasovagal in origin, but this resolved within a few seconds.  At the level of the cervix the uterus was amputated and the defects were closed using interrupted figure-of-eight 2-0 Vicryl suture.      The abdomen was copiously irrigated.  Hemostasis was assured.  The small bleeders were closed using either clips or cautery.  The raw surfaces were covered with a small amount of Surgicel and good hemostasis was assured.  The abdomen was explored.  All the sponges were removed.  Unfortunately, the sponge count was off by a total of 5.  It was felt that this may reflect inaccurate initial count.  For this reason an x-ray was obtained and there did not appear to be any sponges retained.  The fascia was then closed using 0 PDS suture in a looped fashion.  The subcutaneous tissue was closed using 2-0 Vicryl, and the skin was stapled.  The patient was placed back in supine position after a cystoscopy had been performed and there was noted to be good efflux from both ureters and no evidence of defect in the bladder.      The patient tolerated the procedure well and was brought to the recovery room in stable condition.               BRUCE DOBSON MD             D: 2018   T: 2018   MT: ESTEBAN      Name:     ROGELIO LEVIN   MRN:      [id number]        Account:        [id number]   :      1985           Procedure Date: 2018      Document: [id number]

## 2018-06-05 ENCOUNTER — APPOINTMENT (OUTPATIENT)
Dept: ULTRASOUND IMAGING | Facility: CLINIC | Age: 33
End: 2018-06-05
Attending: OBSTETRICS & GYNECOLOGY
Payer: MEDICAID

## 2018-06-05 VITALS
DIASTOLIC BLOOD PRESSURE: 70 MMHG | SYSTOLIC BLOOD PRESSURE: 139 MMHG | WEIGHT: 210 LBS | HEIGHT: 68 IN | OXYGEN SATURATION: 98 % | HEART RATE: 54 BPM | TEMPERATURE: 97.9 F | BODY MASS INDEX: 31.83 KG/M2 | RESPIRATION RATE: 16 BRPM

## 2018-06-05 PROCEDURE — 93971 EXTREMITY STUDY: CPT | Mod: LT

## 2018-06-05 PROCEDURE — 25000132 ZZH RX MED GY IP 250 OP 250 PS 637: Performed by: STUDENT IN AN ORGANIZED HEALTH CARE EDUCATION/TRAINING PROGRAM

## 2018-06-05 PROCEDURE — 25000132 ZZH RX MED GY IP 250 OP 250 PS 637: Performed by: OBSTETRICS & GYNECOLOGY

## 2018-06-05 RX ADMIN — OXYCODONE HYDROCHLORIDE 5 MG: 5 TABLET ORAL at 07:06

## 2018-06-05 RX ADMIN — OXYCODONE HYDROCHLORIDE 10 MG: 5 TABLET ORAL at 11:14

## 2018-06-05 RX ADMIN — SENNOSIDES AND DOCUSATE SODIUM 2 TABLET: 8.6; 5 TABLET ORAL at 07:39

## 2018-06-05 RX ADMIN — ACETAMINOPHEN 975 MG: 325 TABLET, FILM COATED ORAL at 07:39

## 2018-06-05 RX ADMIN — IBUPROFEN 600 MG: 600 TABLET ORAL at 07:06

## 2018-06-05 RX ADMIN — OXYCODONE HYDROCHLORIDE 5 MG: 5 TABLET ORAL at 03:59

## 2018-06-05 NOTE — OP NOTE
[facility]  Operative Note    Name: Emi Yun  MRN: [id number]  : 1985  Date of Surgery: 18  Pre-operative Diagnosis:   IUP at 37w5d  Placenta previa with suspected accreta  Post-operative Diagnosis: Same  Procedure(s):  hysterectomy, bilateral salpingectomy, cystoscopy   Surgeon: Dr. Rain, Dr. Mace   Assistants: Ac Sandra MD  Anesthesia: Combined spinal epidural, GETA, TAPS block  EBL: 2500 mL   Specimens: Uterus, cervix, tubes, placenta   Complications: None apparent.  Findings: Viable female infant delivered at 1237 on 2018 with APGARs 9 & 9 and weight 2948g. Atonic uterus with thin serosal segment over portions of placenta. Normal tubes and ovaries. On cystoscopy, bilateral ureteral jets, no foreign body, no trauma.   Indications: Emi Yun was admitted as a 32 year old  at 37w5d who was transferred from [facility] with concerns including placenta previa with concern for accreta as well as narcotic use in pregnancy. She has completed childbearing. Given these findings, a  hysterectomy was recommended. Risks and benefits were reviewed and the patient consented for the procedure.    Procedure Details: The patient was taken back to the operating room where she underwent combined spinal epidural anesthesia. She was prepped and draped in the usual sterile fashion in the dorsal supine position with a leftward tilt. A safety patient time out was performed. Gent and Clinda were administered. A vertical skin incision was made extending from the pubic symphysis to 2 cm above the umbilicus with the scalpel and carried through the underlying layer to the fascia with cautery. The fascia was incised, elevated, and extended with cautery. The rectus muscles were  in the midline. The peritoneum was identified and entered bluntly. The uterus was entered vertically with the scalpel and extended to the fundus  with bandage scissors. The infant was delivered atraumatically. The cord was clamped and cut and the infant was handed off to the waiting NICU staff.  A segment of cord was taken for cord gases. The placenta was left intact, and the uterus was closed with singe running locked 0 Vicryl.   Please see the operative note by Dr Mace's note for the hysterectomy portion of the case. All lap, instrument, and sharps counts were correct times two. Dr. Rain was scrubbed and present for the procedure.     Ac Nelson MD  6/5/2018 6:03 PM     Staff:  I was scrubbed and present for entire case and agree w/ above note.    Vernell Rain MD

## 2018-06-05 NOTE — PLAN OF CARE
"Problem: Patient Care Overview  Goal: Plan of Care/Patient Progress Review  Outcome: Adequate for Discharge Date Met: 06/05/18  Patient assessment has been stable. Vitals WDL. Last hemoglobin was 7.9 (received 3 units of blood after delivery), patient has remained asymptomatic. Declines headache and dizziness. States that she is voiding without difficulties. Has not has a bowel movement since surgery but states that she is passes gas. Is taking oxycodone, tylenol, and ibuprofen for pain management. Has a classical incision which was draining overnight but has been clear, dry, and intact this morning. Incision closed with staples. Patient has a bruise on her left arm starting in the crease of her elbow and extending down her arm. She states that the location of the bruise is where she had blood draw \" a few days ago.\" Ultrasound done, doctors aware of results and not concerned. Patient to discharge home.         "

## 2018-06-05 NOTE — PROGRESS NOTES
OB/GYN Daily Progress Note, POD#2    S: POD#2  hysterectomy. Today Emi is concerned about some swelling in her left arm at an old IV site. THe whole lower arm is tender and swollen. She also notes that she had some light bloody leaking from her incision, but that is improved this morning. Her pain is present but well controlled. She continues to walk without difficulty, not lightheaded. She has been passing gas, no BM. Not nauseous with regular meals.     O:   VS:   Patient Vitals for the past 24 hrs:   BP Temp Temp src Pulse Heart Rate Resp   /18 1524 129/75 97.9  F (36.6  C) Oral - 67 16   //18 0800 123/72 98  F (36.7  C) Oral 66 - 16   //18 2344 122/72 97.9  F (36.6  C) Oral 63 - 18     General: lying in bed, appears comfortable.   CV: RRR no murmur  Resp: CTAB  Abdomen: soft, appropriately tender especially R upper incision, minimally distended  Incision: Covered in a light bandage, well approximated, staples in place, drops of drainage on bandage.   Extremities: lower extremities non-tender, non-edematous, left upper extremity with echymosis just distal to the antecubital fossa with swelling below, area is minimally tender    Heme 10.6 > EBL 2.5L > 9.8 (intra-op) > 2U pRBCs (intraop) > 10.6 > 9.4> 7.7 > 7.8 > 1u pRBC > 7.9  Plt 205    A: Ms. Yun is a 32 year old now P4, POD#3 s/p  hysterectomy, bilateral salpingectomy, cystoscopy for placenta previa/accreta. Her procedure was complicated by intraprocedural hemorrhage with EBL of 2.5L. Doing well in the postpartum course.     P:  Continue routine pp cares  Swelling in left arm c/w area of infiltrated IV, possible superficial thrombus, extremity doppler ordered.  Hgb stably low, s/p total 3u pRBC, discharge with Fe. No concern for ongoing bleeding, although recheck with new symptoms and consider Btx as <8  S/p 24hrs gent/clinda given prolonged surgery and high EBL   Hx of chronic oxycodone use (~10mg/day): pain regimen of TAPs  blocks, tylenol, toradol > ibuprofen, oxycodone, acetaminophen  GI: tolerating regular diet, prn antiemetics and stool softeners  Feeding: bottle   Contraception: none required - c/hyst  Rh positive, Rubella pending   Disposition: routine PP cares, anticipate d/c POD#3 likely, once discharge goals are obtained    # Pain Assessment:  Current Pain Score 2018   Patient currently in pain? yes   Pain location Incision   Pain descriptors Discomfort   - Emi is experiencing pain due to  hysterectomy. Pain management was discussed and the plan was created in a collaborative fashion.  Emi's response to the current recommendations: engaged  Please see above.     Bel Li, PGY3  OB/Gyn  2018, 11:09 PM      Appreciate Dr. Li's note above, patient also seen and examined by me. I agree with the note as edited above.   Marissa Sykes MD

## 2018-06-05 NOTE — PROVIDER NOTIFICATION
Did pt'c US look ok? Infant's d/c order is in. can you put in mom's d/c order after ultrasound results? Also, should I remove the staples?      06/05/18 1035   Provider Notification   Provider Name/Title Mony   Method of Notification Electronic Page   Request Evaluate-Remote   Notification Reason Other

## 2018-06-05 NOTE — PLAN OF CARE
Problem: Patient Care Overview  Goal: Plan of Care/Patient Progress Review  Outcome: Improving  VSS. Postpartum assessment WNL with exception of patient reporting serosanginuous drainage from her incision; notably scant amount of purulent drainage on anterior incision. Incision reinforced with abdominal dressing until seen by provider. Noted dried drainage on abdominal binder. Provider notified; pt requested to be evaluated in person at 0600. No further complaints. Voiding adequate amounts. Passing flatus. Pain managed with 5 mg oxycodone, tylenol, and IB. Formula feeding. Bonding well with baby. Continue with POC.

## 2018-06-05 NOTE — PROVIDER NOTIFICATION
06/05/18 0408   Provider Notification   Provider Name/Title Dr. Collier   Method of Notification Electronic Page   Notification Reason Status Update   Pt does not want to be assessed at this time-will wait until 0600 rounding. I had placed gauze pads on incision at 0040 and there is no drainage. Thanks!

## 2018-06-05 NOTE — PLAN OF CARE
Problem: Patient Care Overview  Goal: Plan of Care/Patient Progress Review  Outcome: Improving  Vital signs stable. Postpartum assessment WDL. Incision clean, dry, intact and open to air. Pain controlled with tylenol, ibuprofen and oxycodone. Patient ambulating independently and up walking in hallway.  Patient voiding independently and passing gas but has not had a bowel movement yet.  Patient and infant bonding well. Will continue with current plan of care.

## 2018-06-05 NOTE — PROVIDER NOTIFICATION
06/05/18 0028   Provider Notification   Provider Name/Title Dr. Collier G2   Method of Notification Electronic Page   Request Evaluate in Person   Notification Reason Status Update  (incision bleeding)   Pt's incision is bleeding (it looks to be at 2 or 3 staple sites). Pt reported it just started bleeding in the last 15 min. Pt also reported yellow fluid leaking today from incision. Please call/come to assess. Thanks!    -Dr. Collier called, will come assess pt when able to. Plan for now: apply dressing over incision.

## 2018-06-10 LAB — THC UR QL CFM: NORMAL

## 2018-07-09 LAB — COPATH REPORT: NORMAL

## 2020-03-07 ENCOUNTER — HOSPITAL ENCOUNTER (EMERGENCY)
Facility: CLINIC | Age: 35
Discharge: HOME | End: 2020-03-07
Attending: EMERGENCY MEDICINE | Admitting: EMERGENCY MEDICINE

## 2020-03-07 VITALS
DIASTOLIC BLOOD PRESSURE: 75 MMHG | TEMPERATURE: 96.5 F | OXYGEN SATURATION: 100 % | RESPIRATION RATE: 10 BRPM | SYSTOLIC BLOOD PRESSURE: 125 MMHG | HEART RATE: 95 BPM | WEIGHT: 164.9 LBS

## 2020-03-07 DIAGNOSIS — F10.921 ALCOHOL INTOXICATION WITH DELIRIUM (H): ICD-10-CM

## 2020-03-07 LAB
ALBUMIN SERPL-MCNC: 3.6 G/DL (ref 3.4–5)
ALP SERPL-CCNC: 107 U/L (ref 40–150)
ALT SERPL W P-5'-P-CCNC: 18 U/L (ref 0–50)
AMPHETAMINES UR QL SCN: POSITIVE
ANION GAP SERPL CALCULATED.3IONS-SCNC: 4 MMOL/L (ref 3–14)
AST SERPL W P-5'-P-CCNC: 10 U/L (ref 0–45)
B-HCG FREE SERPL-ACNC: <5 IU/L
BARBITURATES UR QL: NEGATIVE
BASOPHILS # BLD AUTO: 0 10E9/L (ref 0–0.2)
BASOPHILS NFR BLD AUTO: 0.3 %
BENZODIAZ UR QL: NEGATIVE
BILIRUB SERPL-MCNC: 0.2 MG/DL (ref 0.2–1.3)
BUN SERPL-MCNC: 13 MG/DL (ref 7–30)
CALCIUM SERPL-MCNC: 8.4 MG/DL (ref 8.5–10.1)
CANNABINOIDS UR QL SCN: NEGATIVE
CHLORIDE SERPL-SCNC: 107 MMOL/L (ref 94–109)
CO2 SERPL-SCNC: 28 MMOL/L (ref 20–32)
COCAINE UR QL: NEGATIVE
CREAT SERPL-MCNC: 0.75 MG/DL (ref 0.52–1.04)
DIFFERENTIAL METHOD BLD: ABNORMAL
EOSINOPHIL # BLD AUTO: 0.2 10E9/L (ref 0–0.7)
EOSINOPHIL NFR BLD AUTO: 1.3 %
ERYTHROCYTE [DISTWIDTH] IN BLOOD BY AUTOMATED COUNT: 13.3 % (ref 10–15)
ETHANOL SERPL-MCNC: 0.3 G/DL
GFR SERPL CREATININE-BSD FRML MDRD: >90 ML/MIN/{1.73_M2}
GLUCOSE SERPL-MCNC: 98 MG/DL (ref 70–99)
HCT VFR BLD AUTO: 43 % (ref 35–47)
HGB BLD-MCNC: 14.2 G/DL (ref 11.7–15.7)
IMM GRANULOCYTES # BLD: 0 10E9/L (ref 0–0.4)
IMM GRANULOCYTES NFR BLD: 0.3 %
LYMPHOCYTES # BLD AUTO: 2.7 10E9/L (ref 0.8–5.3)
LYMPHOCYTES NFR BLD AUTO: 23.1 %
MCH RBC QN AUTO: 30.6 PG (ref 26.5–33)
MCHC RBC AUTO-ENTMCNC: 33 G/DL (ref 31.5–36.5)
MCV RBC AUTO: 93 FL (ref 78–100)
MONOCYTES # BLD AUTO: 0.7 10E9/L (ref 0–1.3)
MONOCYTES NFR BLD AUTO: 5.6 %
NEUTROPHILS # BLD AUTO: 8.3 10E9/L (ref 1.6–8.3)
NEUTROPHILS NFR BLD AUTO: 69.4 %
NRBC # BLD AUTO: 0 10*3/UL
NRBC BLD AUTO-RTO: 0 /100
OPIATES UR QL SCN: NEGATIVE
PCP UR QL SCN: NEGATIVE
PLATELET # BLD AUTO: 314 10E9/L (ref 150–450)
POTASSIUM SERPL-SCNC: 3.5 MMOL/L (ref 3.4–5.3)
PROT SERPL-MCNC: 7.6 G/DL (ref 6.8–8.8)
RBC # BLD AUTO: 4.64 10E12/L (ref 3.8–5.2)
SODIUM SERPL-SCNC: 139 MMOL/L (ref 133–144)
TROPONIN I BLD-MCNC: 0 UG/L (ref 0–0.08)
WBC # BLD AUTO: 11.9 10E9/L (ref 4–11)

## 2020-03-07 PROCEDURE — 25000128 H RX IP 250 OP 636: Performed by: EMERGENCY MEDICINE

## 2020-03-07 PROCEDURE — 80053 COMPREHEN METABOLIC PANEL: CPT | Performed by: EMERGENCY MEDICINE

## 2020-03-07 PROCEDURE — 84702 CHORIONIC GONADOTROPIN TEST: CPT

## 2020-03-07 PROCEDURE — 96375 TX/PRO/DX INJ NEW DRUG ADDON: CPT

## 2020-03-07 PROCEDURE — 51702 INSERT TEMP BLADDER CATH: CPT

## 2020-03-07 PROCEDURE — 85025 COMPLETE CBC W/AUTO DIFF WBC: CPT | Performed by: EMERGENCY MEDICINE

## 2020-03-07 PROCEDURE — 96374 THER/PROPH/DIAG INJ IV PUSH: CPT

## 2020-03-07 PROCEDURE — 99285 EMERGENCY DEPT VISIT HI MDM: CPT | Mod: 25

## 2020-03-07 PROCEDURE — 80307 DRUG TEST PRSMV CHEM ANLYZR: CPT | Performed by: EMERGENCY MEDICINE

## 2020-03-07 PROCEDURE — 25000128 H RX IP 250 OP 636

## 2020-03-07 PROCEDURE — 80320 DRUG SCREEN QUANTALCOHOLS: CPT | Performed by: EMERGENCY MEDICINE

## 2020-03-07 PROCEDURE — 93005 ELECTROCARDIOGRAM TRACING: CPT

## 2020-03-07 PROCEDURE — 84484 ASSAY OF TROPONIN QUANT: CPT

## 2020-03-07 RX ORDER — NALOXONE HYDROCHLORIDE 0.4 MG/ML
0.5 INJECTION, SOLUTION INTRAMUSCULAR; INTRAVENOUS; SUBCUTANEOUS ONCE
Status: COMPLETED | OUTPATIENT
Start: 2020-03-07 | End: 2020-03-07

## 2020-03-07 RX ORDER — ONDANSETRON 2 MG/ML
INJECTION INTRAMUSCULAR; INTRAVENOUS
Status: COMPLETED
Start: 2020-03-07 | End: 2020-03-07

## 2020-03-07 RX ORDER — NALOXONE HYDROCHLORIDE 1 MG/ML
INJECTION INTRAMUSCULAR; INTRAVENOUS; SUBCUTANEOUS
Status: DISCONTINUED
Start: 2020-03-07 | End: 2020-03-07 | Stop reason: HOSPADM

## 2020-03-07 RX ORDER — ONDANSETRON 2 MG/ML
4 INJECTION INTRAMUSCULAR; INTRAVENOUS ONCE
Status: COMPLETED | OUTPATIENT
Start: 2020-03-07 | End: 2020-03-07

## 2020-03-07 RX ADMIN — NALOXONE HYDROCHLORIDE 0.5 MG: 0.4 INJECTION, SOLUTION INTRAMUSCULAR; INTRAVENOUS; SUBCUTANEOUS at 03:49

## 2020-03-07 RX ADMIN — ONDANSETRON 4 MG: 2 INJECTION INTRAMUSCULAR; INTRAVENOUS at 03:54

## 2020-03-07 NOTE — ED AVS SNAPSHOT
Emergency Department  [address] [place] [address]  Phone:  [phone]  Fax:  [phone]                                    Emi Yun   MRN: [id number]    Department:   Emergency Department   Date of Visit:  3/7/2020           After Visit Summary Signature Page    I have received my discharge instructions, and my questions have been answered. I have discussed any challenges I see with this plan with the nurse or doctor.    ..........................................................................................................................................  Patient/Patient Representative Signature      ..........................................................................................................................................  Patient Representative Print Name and Relationship to Patient    ..................................................               ................................................  Date                                   Time    ..........................................................................................................................................  Reviewed by Signature/Title    ...................................................              ..............................................  Date                                               Time          22EPIC Rev 08/18

## 2020-03-07 NOTE — ED TRIAGE NOTES
EMS arrival:    Patient arrives via EMS from hotel.    Drinking ETOH since 2200.  Unresponsive in back of [facility].  EMS gave 2 dose, 0.8 mg each, Narcan.  Woke up slightly with 2nd dose.   Arrived to ED, not responding to sternal rub.  Moved to [facility] from [facility].

## 2020-03-07 NOTE — ED NOTES
Patient yelling out & agitated during urinary catheter insertion.  Moving all extremities also.    Patient back to sleep following catheter insertion.  Soft restraints applied due to patient agitation & pulling at IV lines.    VSS stable.  Labs & UA sent.

## 2020-03-07 NOTE — ED PROVIDER NOTES
History     Chief Complaint:    Altered Mental Status    The history is provided by the EMS personnel.      Emi Yun is a 34 year old female who arrived via EMS and presents with altered mental status. Per EMS, the patient was unresponsive in the back of an [employer] in front of a hotel in [place]. Her significant other was at the scene and states that she has no permanent address. He also reported that she had been drinking alcohol since 2200 last night. The patient was given 0.8 mg of Narcan on scene with no effect. En route she was given another 0.8 mg of Narcan and woke up a bit. EMS state that she had a blood sugar of 85.    Allergies:  No known drug allergies.     Medications:    The patient is currently on no regular medications.     Past Medical History:    The patient denies any significant past medical history.     Past Surgical History:    The patient does not have any pertinent past surgical history.    Family History:    No past pertinent family history.     Social History:  Tobacco use - unknown.  Positive for alcohol use.   Positive for drug use.  Marital Status:  Single [1]     Review of Systems   Unable to perform ROS: Mental status change         Physical Exam     Patient Vitals for the past 24 hrs:   BP Temp Temp src Pulse Heart Rate Resp SpO2 Weight   03/07/20 0500 110/77 -- -- 84 73 11 100 % --   03/07/20 0430 -- -- -- -- 75 13 100 % --   03/07/20 0420 117/77 -- -- 74 70 12 100 % --   03/07/20 0410 119/81 -- -- 71 70 13 100 % --   03/07/20 0400 (!) 124/90 -- -- -- 72 20 100 % --   03/07/20 0351 (!) 132/91 96.5  F (35.8  C) Temporal -- 90 22 -- 74.8 kg (164 lb 14.5 oz)   03/07/20 0350 -- -- -- -- 94 23 97 % --     Physical Exam  General: Appears well-developed and well-nourished.   Head: No signs of trauma.   Mouth/Throat: Oropharynx is clear and moist.   Eyes: Conjunctivae are normal. Pupils pinpoint  Neck: Normal range of motion. No nuchal rigidity. No cervical adenopathy  CV: Normal  rate and regular rhythm.    Resp: Effort normal and breath sounds normal. No respiratory distress.   GI: Soft. There is no tenderness.  No rebound or guarding.  Normal bowel sounds.    MSK: Normal range of motion. no edema.   Neuro: The patient arousable to painful stimuli.  Moves all extremities to stimulation.    Skin: Skin is warm and dry. No rash noted.     Emergency Department Course     ECG (4:02:58):  Rate 73 bpm. ID interval 180. QRS duration 92. QT/QTc 398/438. P-R-T axes 57 99 70. Normal sinus rhythm. Rightward axis. Borderline ECG. Interpreted at 0414 by Pro Low MD.    Laboratory:  Laboratory findings were communicated with the patient who voiced understanding of the findings.    CBC: WBC 11.9 H o/w WNL (HGB 14.2, )  CMP: Calcium 8.4 L o/w WNL (Creatinine 0.75)  Alcohol level blood: 0.30 H  Troponin POCT: 0.00  ISTAT HCG Quantitative Pregnancy POCT: <5.0    Drug abuse screen 77 urine (FL, RH, SH): Positive for amphetamine    Interventions:  0349: Narcan 0.5 mg IV  0354: Zofran 4 mg IV    Emergency Department Course:  Past medical records, nursing notes, and vitals reviewed.    0347: I performed an exam of the patient as documented above.     0406: Patient rechecked.    EKG obtained in the ED, see results above.     IV was inserted and blood was drawn for laboratory testing, results above.    The patient provided a urine sample here in the emergency department. This was sent for laboratory testing, findings above.    I personally reviewed the laboratory, EKG, and imaging results with the Patient and answered all related questions prior to sign out.     Patient signed out to Dr. Pérez.    Impression & Plan     Medical Decision Making:  Emi Yun is a 34-year-old woman who presents due to altered mental status. She apparently had admitted to drinking. EMS noted that she had pinpoint pupils as well and gave Narcan in route with some response. On presentation to the ER, she would  respond somewhat to a sternal rub. She continued to have very pinpoint pupils so I did give some additional Narcan which did seem to improve her response. Blood work was obtained that did show an elevated alcohol level as well. There are no external signs of trauma. Patient has been maintaining her airway and her oxygen levels. She was signed out to Dr. Pérez with plan for repeat evaluation and likely discharge once clinically appropriate.    Diagnosis:    ICD-10-CM   1. Alcohol intoxication with delirium (H) F10.921     Disposition:  Signed out to Dr. Pérez, pending reevaluation.    Discharge Medications:  New Prescriptions    No medications on file     Scribe Disclosure:  Joanna CASTAÑEDA, am serving as a scribe at 3:48 AM on 3/7/2020 to document services personally performed by Pro Low MD based on my observations and the provider's statements to me.     Joanna Emmanuel  3/7/2020    EMERGENCY DEPARTMENT       Pro Low MD  03/08/20 0035

## 2021-12-20 ENCOUNTER — HOSPITAL ENCOUNTER (EMERGENCY)
Facility: CLINIC | Age: 36
Discharge: HOME | End: 2021-12-20
Attending: PHYSICIAN ASSISTANT | Admitting: PHYSICIAN ASSISTANT
Payer: COMMERCIAL

## 2021-12-20 VITALS
TEMPERATURE: 97.9 F | RESPIRATION RATE: 18 BRPM | BODY MASS INDEX: 28.13 KG/M2 | WEIGHT: 185 LBS | HEART RATE: 71 BPM | SYSTOLIC BLOOD PRESSURE: 128 MMHG | OXYGEN SATURATION: 98 % | DIASTOLIC BLOOD PRESSURE: 89 MMHG

## 2021-12-20 DIAGNOSIS — A59.9 TRICHOMONAS VAGINALIS INFECTION: ICD-10-CM

## 2021-12-20 LAB
ALBUMIN UR-MCNC: NEGATIVE MG/DL
APPEARANCE UR: ABNORMAL
BILIRUB UR QL STRIP: NEGATIVE
CLUE CELLS: PRESENT
COLOR UR AUTO: YELLOW
GLUCOSE UR STRIP-MCNC: NEGATIVE MG/DL
HCG UR QL: NEGATIVE
HGB UR QL STRIP: ABNORMAL
KETONES UR STRIP-MCNC: NEGATIVE MG/DL
LEUKOCYTE ESTERASE UR QL STRIP: ABNORMAL
MUCOUS THREADS #/AREA URNS LPF: PRESENT /LPF
NITRATE UR QL: NEGATIVE
PH UR STRIP: 5 [PH] (ref 5–7)
RBC URINE: 8 /HPF
SP GR UR STRIP: 1.01 (ref 1–1.03)
SQUAMOUS EPITHELIAL: 1 /HPF
TRICHOMONAS, WET PREP: PRESENT
UROBILINOGEN UR STRIP-MCNC: NORMAL MG/DL
WBC URINE: 24 /HPF
WBC'S/HIGH POWER FIELD, WET PREP: ABNORMAL
YEAST, WET PREP: ABNORMAL

## 2021-12-20 PROCEDURE — 87210 SMEAR WET MOUNT SALINE/INK: CPT | Performed by: PHYSICIAN ASSISTANT

## 2021-12-20 PROCEDURE — 87491 CHLMYD TRACH DNA AMP PROBE: CPT | Performed by: PHYSICIAN ASSISTANT

## 2021-12-20 PROCEDURE — 250N000011 HC RX IP 250 OP 636: Performed by: PHYSICIAN ASSISTANT

## 2021-12-20 PROCEDURE — 99214 OFFICE O/P EST MOD 30 MIN: CPT | Performed by: PHYSICIAN ASSISTANT

## 2021-12-20 PROCEDURE — 87591 N.GONORRHOEAE DNA AMP PROB: CPT | Performed by: PHYSICIAN ASSISTANT

## 2021-12-20 PROCEDURE — 81025 URINE PREGNANCY TEST: CPT | Performed by: PHYSICIAN ASSISTANT

## 2021-12-20 PROCEDURE — 250N000009 HC RX 250: Performed by: PHYSICIAN ASSISTANT

## 2021-12-20 PROCEDURE — 87088 URINE BACTERIA CULTURE: CPT | Performed by: PHYSICIAN ASSISTANT

## 2021-12-20 PROCEDURE — 96372 THER/PROPH/DIAG INJ SC/IM: CPT | Performed by: PHYSICIAN ASSISTANT

## 2021-12-20 PROCEDURE — 81001 URINALYSIS AUTO W/SCOPE: CPT | Performed by: PHYSICIAN ASSISTANT

## 2021-12-20 PROCEDURE — G0463 HOSPITAL OUTPT CLINIC VISIT: HCPCS | Performed by: PHYSICIAN ASSISTANT

## 2021-12-20 RX ORDER — METRONIDAZOLE 500 MG/1
500 TABLET ORAL 2 TIMES DAILY
Qty: 14 TABLET | Refills: 0 | Status: SHIPPED | OUTPATIENT
Start: 2021-12-20 | End: 2021-12-27

## 2021-12-20 RX ORDER — DOXYCYCLINE 100 MG/1
100 CAPSULE ORAL 2 TIMES DAILY
Qty: 14 CAPSULE | Refills: 0 | Status: SHIPPED | OUTPATIENT
Start: 2021-12-20 | End: 2021-12-27

## 2021-12-20 RX ADMIN — LIDOCAINE HYDROCHLORIDE 500 MG: 10 INJECTION, SOLUTION EPIDURAL; INFILTRATION; INTRACAUDAL; PERINEURAL at 16:34

## 2021-12-20 NOTE — ED PROVIDER NOTES
History     Chief Complaint   Patient presents with     Vaginal Problem     HPI  Emi Snyder is a 36 year old female who presents to the urgent care with concern over vaginal discharge is present for the last 3 to 4 weeks.  Patient reports that she had a virtual visit with a provider at onset of symptoms diagnosed with possible urinary tract infection treated with 5 days of Macrobid when symptoms fail to improve she was given medications again at time of presentation to [facility], treated for possible yeast infection with diflucan. She continues to have some intermittently bloody vaginal discharge, dysuria. She denies any urinary frequency, urgency, abdominal or pelvic pain.  No low back or flank pain.  She has not had any nausea, vomiting, diarrhea or stool changes.  She states there is some possibility of sex transmitted infection noting that she recently reunited with her  whom she had been  he did admit to other sexual partners while they were .  She is status post hysterectomy, salpingectomy.      Allergies:  Allergies   Allergen Reactions     Penicillins Shortness Of Breath and Hives       Problem List:    Patient Active Problem List    Diagnosis Date Noted     S/P emergency  hysterectomy 2018     Priority: Medium     Encounter for triage in pregnant patient 2018     Priority: Medium     Partial previa 2018     Priority: Medium      Past Medical History:    Past Medical History:   Diagnosis Date     Anxiety      Depression      History of blood transfusion 2016     Opioid abuse (H)      PTSD (post-traumatic stress disorder)        Past Surgical History:    Past Surgical History:   Procedure Laterality Date      SECTION      x3      SECTION, IMMEDIATE HYSTERECTOMY, COMBINED N/A 2018    Procedure: COMBINED  SECTION, IMMEDIATE HYSTERECTOMY;   Hysterectomy, Bilateral Salpingectomy, Cystoscopy. ;  Surgeon: Vernell Rain  MD Shanna;  Location: [facility] OR     CYSTOSCOPY N/A 6/2/2018    Procedure: CYSTOSCOPY;;  Surgeon: Vernell Rain MD;  Location: [facility] OR     Family History:    Family History   Adopted: Yes     Social History:  Marital Status:   [2]  Social History     Tobacco Use     Smoking status: Never Smoker     Smokeless tobacco: Never Used   Substance Use Topics     Alcohol use: No     Drug use: Yes     Types: Opiates     Comment: 3-4      Medications:    ferrous sulfate (IRON) 325 (65 Fe) MG tablet  ibuprofen (ADVIL/MOTRIN) 600 MG tablet  oxyCODONE-acetaminophen (PERCOCET) 5-325 MG per tablet  Prenatal Vit-Fe Fumarate-FA (PRENATAL MULTIVITAMIN PLUS IRON) 27-0.8 MG TABS per tablet  senna-docusate (SENOKOT-S;PERICOLACE) 8.6-50 MG per tablet      Review of Systems  CONSTITUTIONAL:NEGATIVE for fever, chills, change in weight  INTEGUMENTARY/SKIN: NEGATIVE for worrisome rashes, moles or lesions  RESP:NEGATIVE for significant cough or SOB  GI: NEGATIVE for abdominal pain, diarrhea, nausea and vomiting  : POSITIVE for dysuria, vaginal discharge NEGATIVE for there is urinary frequency, urgency  Physical Exam   BP: 128/89  Pulse: 71  Temp: 97.9  F (36.6  C)  Resp: 18  Weight: 83.9 kg (185 lb)  SpO2: 98 %  Physical Exam  GENERAL APPEARANCE: healthy, alert and no distress  RESP: lungs clear to auscultation - no rales, rhonchi or wheezes  CV: regular rates and rhythm, normal S1 S2, no murmur noted  ABDOMEN:  soft, nontender, no HSM or masses and bowel sounds normal  BACK: No CVA tenderness  GU_female: external genitalia normal, thin watery blood tinged discharge, strawberry cervix  SKIN: no suspicious lesions or rashes  ED Course           Procedures       Critical Care time:  none        Results for orders placed or performed during the hospital encounter of 12/20/21 (from the past 24 hour(s))   Wet prep    Specimen: Vagina; Swab   Result Value Ref Range    Trichomonas Present (A) Absent    Yeast Absent Absent    Clue Cells  Present (A) Absent    WBCs/high power field 4+ (A) None   UA with Microscopic reflex to Culture    Specimen: Urine, Midstream   Result Value Ref Range    Color Urine Yellow Colorless, Straw, Light Yellow, Yellow    Appearance Urine Slightly Cloudy (A) Clear    Glucose Urine Negative Negative mg/dL    Bilirubin Urine Negative Negative    Ketones Urine Negative Negative mg/dL    Specific Gravity Urine 1.012 1.003 - 1.035    Blood Urine Moderate (A) Negative    pH Urine 5.0 5.0 - 7.0    Protein Albumin Urine Negative Negative mg/dL    Urobilinogen Urine Normal Normal, 2.0 mg/dL    Nitrite Urine Negative Negative    Leukocyte Esterase Urine Large (A) Negative    Mucus Urine Present (A) None Seen /LPF    RBC Urine 8 (H) <=2 /HPF    WBC Urine 24 (H) <=5 /HPF    Squamous Epithelials Urine 1 <=1 /HPF    Narrative    Urine Culture ordered based on laboratory criteria   HCG qualitative urine   Result Value Ref Range    hCG Urine Qualitative Negative Negative     Medications - No data to display    Assessments & Plan (with Medical Decision Making)     I have reviewed the nursing notes.  I have reviewed the findings, diagnosis, plan and need for follow up with the patient.       Discharge Medication List as of 12/20/2021  4:53 PM      START taking these medications    Details   doxycycline hyclate (VIBRAMYCIN) 100 MG capsule Take 1 capsule (100 mg) by mouth 2 times daily for 7 days, Disp-14 capsule, R-0, E-Prescribe      metroNIDAZOLE (FLAGYL) 500 MG tablet Take 1 tablet (500 mg) by mouth 2 times daily for 7 days, Disp-14 tablet, R-0, E-PrescribeEat yogurt or cottage cheese daily to prevent diarrhea that can be caused by taking this antibiotic.           Final diagnoses:   Trichomonas vaginalis infection     56-year-old female presents to the urgent care with concern over vaginal itching and discharge and present for the last 3 to 4 weeks.  Physical exam findings significant for copious amounts of watery blood-tinged discharge.   She had a wet prep which was positive for trichomonas, +4 WBC/HPF.  Negative pregnancy test.  Urinalysis did show minimally increased number of WBCs however I have low suspicion for acute cystitis, pyelonephritis and will defer additional antibiotics pending urine culture.  Given her risk factors I did discuss her/benefits of empiric treatment for chlamydia and gonorrhea and patient elected to initiate treatment.  She was given a dose of Rocephin in the department, monitored for additional 20 minutes following injection without evidence of adverse effects.  She was discharged home with prescriptions for Flagyl, doxycycline.  Follow-up if no improvement within the next 3 to 5 days.  Worrisome reasons to return to the ER/UC sooner discussed.     Disclaimer: This note consists of symbols derived from keyboarding, dictation, and/or voice recognition software. As a result, there may be errors in the script that have gone undetected.  Please consider this when interpreting information found in the chart.    12/20/2021   [facility] EMERGENCY DEPT    Remedios Newell PA-C  12/20/21 8018

## 2021-12-20 NOTE — ED TRIAGE NOTES
Pt treated for yeast infection one week ago. Pt continues to have discharge with odor. Pt is from [facility].

## 2021-12-21 LAB
C TRACH DNA SPEC QL NAA+PROBE: NEGATIVE
N GONORRHOEA DNA SPEC QL NAA+PROBE: NEGATIVE

## 2021-12-22 LAB
BACTERIA UR CULT: ABNORMAL
BACTERIA UR CULT: ABNORMAL

## 2021-12-22 NOTE — RESULT ENCOUNTER NOTE
Final urine culture report is negative.  Adult Negative Urine culture parameters per protocol: Any # Urogenital single or mixed organism, <10,000 col/ml single organism (cath/midstream), and > 3 organisms (No susceptibilities performed).  [facility] Emergency Dept discharge antibiotic prescribed (If applicable): Doxycycline  Treatment recommendations per [facility] ED Lab Result Urine Culture protocol.

## 2023-11-30 NOTE — ED NOTES
Pt ambulated to and from bathroom. Gait sl unsteady. Pt bedding changed. Pt given menu   Quality 226: Preventive Care And Screening: Tobacco Use: Screening And Cessation Intervention: Patient screened for tobacco use and is an ex/non-smoker Quality 154 Part A: Falls: Risk Assessment (Should Be Reported With Measure 155.): Falls risk assessment not completed, reason not otherwise specified Quality 130: Documentation Of Current Medications In The Medical Record: Current Medications Documented Quality 110: Preventive Care And Screening: Influenza Immunization: Influenza Immunization Ordered or Recommended, but not Administered due to system reason Detail Level: Detailed Quality 431: Preventive Care And Screening: Unhealthy Alcohol Use - Screening: Patient not identified as an unhealthy alcohol user when screened for unhealthy alcohol use using a systematic screening method

## (undated) DEVICE — ESU CLEANER TIP 31142717

## (undated) DEVICE — PREP POVIDONE IODINE SOLUTION 10% 120ML

## (undated) DEVICE — PACK C-SECTION LF PL15OTA83B

## (undated) DEVICE — ESU PENCIL W/HOLSTER E2350H

## (undated) DEVICE — STRAP KNEE/BODY 31143004

## (undated) DEVICE — ESU GROUND PAD UNIVERSAL W/O CORD

## (undated) DEVICE — LINEN ORTHO PACK 5446

## (undated) DEVICE — TUBING IRRIG CYSTO/BLADDER SET 81" LF 2C4040

## (undated) DEVICE — DRAPE IOBAN C-SECTION W/POUCH 30X35" 6657

## (undated) DEVICE — SURGICEL HEMOSTAT 4X8" 1952

## (undated) DEVICE — SU VICRYL 2-0 CT-1 27" J339H

## (undated) DEVICE — PREP CHLORAPREP 26ML TINTED ORANGE  260815

## (undated) DEVICE — Device

## (undated) DEVICE — SU VICRYL 0 TIE 54" J608H

## (undated) DEVICE — DRAPE SETUP C-SECTION INVISISHIELD 54X90" DYNJE5600

## (undated) DEVICE — SOL WATER IRRIG 1000ML BOTTLE 2F7114

## (undated) DEVICE — SPONGE LAP 18X18" X8435

## (undated) DEVICE — CATH TRAY FOLEY SURESTEP 16FR WDRAIN BAG STLK LATEX A300316A

## (undated) DEVICE — SOL NACL 0.9% IRRIG 1000ML BOTTLE 2F7124

## (undated) DEVICE — LINEN GOWN X4 5410

## (undated) DEVICE — LINEN TOWEL PACK X5 5464

## (undated) DEVICE — SU VICRYL 0 CT-1 36" J346H

## (undated) DEVICE — SYR BULB IRRIG 50ML LATEX FREE 0035280

## (undated) DEVICE — DRAPE MAYO STAND 23X54 8337

## (undated) DEVICE — WIPES FOLEY CARE SURESTEP PROVON DFC100

## (undated) DEVICE — SUCTION MANIFOLD DORNOCH ULTRA CART UL-CL500

## (undated) DEVICE — GLOVE PROTEXIS W/NEU-THERA 6.5  2D73TE65

## (undated) DEVICE — GLOVE PROTEXIS BLUE W/NEU-THERA 7.0  2D73EB70

## (undated) DEVICE — TUBING SUCTION MEDI-VAC 1/4"X20' N620A

## (undated) DEVICE — PREP SKIN SCRUB TRAY 4461A

## (undated) DEVICE — ESU ELEC BLADE 6" COATED E1450-6

## (undated) DEVICE — DRAPE LAVH/LAPAROSCOPY W/POUCH 29474

## (undated) DEVICE — SU PDS II 0 CTX 60" Z990G

## (undated) DEVICE — COVER CAMERA IN-LIGHT DISP LT-C02

## (undated) RX ORDER — HYDROMORPHONE HYDROCHLORIDE 1 MG/ML
INJECTION, SOLUTION INTRAMUSCULAR; INTRAVENOUS; SUBCUTANEOUS
Status: DISPENSED
Start: 2018-06-02

## (undated) RX ORDER — NEOSTIGMINE METHYLSULFATE 1 MG/ML
VIAL (ML) INJECTION
Status: DISPENSED
Start: 2018-06-02

## (undated) RX ORDER — PHENYLEPHRINE HCL IN 0.9% NACL 1 MG/10 ML
SYRINGE (ML) INTRAVENOUS
Status: DISPENSED
Start: 2018-06-02

## (undated) RX ORDER — GLYCOPYRROLATE 0.2 MG/ML
INJECTION, SOLUTION INTRAMUSCULAR; INTRAVENOUS
Status: DISPENSED
Start: 2018-06-02

## (undated) RX ORDER — ONDANSETRON 2 MG/ML
INJECTION INTRAMUSCULAR; INTRAVENOUS
Status: DISPENSED
Start: 2018-06-02

## (undated) RX ORDER — LIDOCAINE HYDROCHLORIDE 20 MG/ML
INJECTION, SOLUTION EPIDURAL; INFILTRATION; INTRACAUDAL; PERINEURAL
Status: DISPENSED
Start: 2018-06-02

## (undated) RX ORDER — FENTANYL CITRATE 50 UG/ML
INJECTION, SOLUTION INTRAMUSCULAR; INTRAVENOUS
Status: DISPENSED
Start: 2018-06-02

## (undated) RX ORDER — PROPOFOL 10 MG/ML
INJECTION, EMULSION INTRAVENOUS
Status: DISPENSED
Start: 2018-06-02

## (undated) RX ORDER — CALCIUM CHLORIDE 100 MG/ML
INJECTION INTRAVENOUS; INTRAVENTRICULAR
Status: DISPENSED
Start: 2018-06-02

## (undated) RX ORDER — EPHEDRINE SULFATE 50 MG/ML
INJECTION, SOLUTION INTRAMUSCULAR; INTRAVENOUS; SUBCUTANEOUS
Status: DISPENSED
Start: 2018-06-02

## (undated) RX ORDER — CLINDAMYCIN PHOSPHATE 900 MG/50ML
INJECTION, SOLUTION INTRAVENOUS
Status: DISPENSED
Start: 2018-06-02